# Patient Record
Sex: FEMALE | URBAN - METROPOLITAN AREA
[De-identification: names, ages, dates, MRNs, and addresses within clinical notes are randomized per-mention and may not be internally consistent; named-entity substitution may affect disease eponyms.]

---

## 2023-12-11 ENCOUNTER — HOSPITAL ENCOUNTER (INPATIENT)
Facility: HOSPITAL | Age: 88
LOS: 3 days | Discharge: NURSING, REHAB OR LONG-TERM CARE | DRG: 086 | End: 2023-12-14
Attending: EMERGENCY MEDICINE | Admitting: NURSE PRACTITIONER
Payer: MEDICARE

## 2023-12-11 ENCOUNTER — APPOINTMENT (OUTPATIENT)
Dept: RADIOLOGY | Facility: HOSPITAL | Age: 88
DRG: 086 | End: 2023-12-11
Payer: MEDICARE

## 2023-12-11 DIAGNOSIS — S22.20XA CLOSED FRACTURE OF STERNUM, UNSPECIFIED PORTION OF STERNUM, INITIAL ENCOUNTER: ICD-10-CM

## 2023-12-11 DIAGNOSIS — S02.832A: ICD-10-CM

## 2023-12-11 DIAGNOSIS — E87.1 HYPONATREMIA: ICD-10-CM

## 2023-12-11 DIAGNOSIS — S32.592A: ICD-10-CM

## 2023-12-11 DIAGNOSIS — S09.90XA HEAD INJURY, INITIAL ENCOUNTER: ICD-10-CM
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
## 2023-12-11 LAB
ABO GROUP (TYPE) IN BLOOD: NORMAL
ACANTHOCYTES BLD QL SMEAR: ABNORMAL
ALBUMIN SERPL BCP-MCNC: 3 G/DL (ref 3.4–5)
ALP SERPL-CCNC: 94 U/L (ref 33–136)
ALT SERPL W P-5'-P-CCNC: 9 U/L (ref 7–45)
ANION GAP SERPL CALC-SCNC: 11 MMOL/L (ref 10–20)
ANTIBODY SCREEN: NORMAL
AST SERPL W P-5'-P-CCNC: 15 U/L (ref 9–39)
BASOPHILS # BLD MANUAL: 0 X10*3/UL (ref 0–0.1)
BASOPHILS NFR BLD MANUAL: 0 %
BILIRUB SERPL-MCNC: 1.3 MG/DL (ref 0–1.2)
BUN SERPL-MCNC: 22 MG/DL (ref 6–23)
BURR CELLS BLD QL SMEAR: ABNORMAL
CALCIUM SERPL-MCNC: 10.3 MG/DL (ref 8.6–10.3)
CHLORIDE SERPL-SCNC: 90 MMOL/L (ref 98–107)
CO2 SERPL-SCNC: 28 MMOL/L (ref 21–32)
CREAT SERPL-MCNC: 1.05 MG/DL (ref 0.5–1.05)
EOSINOPHIL # BLD MANUAL: 0 X10*3/UL (ref 0–0.4)
EOSINOPHIL NFR BLD MANUAL: 0 %
ERYTHROCYTE [DISTWIDTH] IN BLOOD BY AUTOMATED COUNT: 16.4 % (ref 11.5–14.5)
GFR SERPL CREATININE-BSD FRML MDRD: 49 ML/MIN/1.73M*2
GLUCOSE SERPL-MCNC: 130 MG/DL (ref 74–99)
HCT VFR BLD AUTO: 30.5 % (ref 36–46)
HGB BLD-MCNC: 10.4 G/DL (ref 12–16)
IMM GRANULOCYTES # BLD AUTO: 0.26 X10*3/UL (ref 0–0.5)
IMM GRANULOCYTES NFR BLD AUTO: 0.6 % (ref 0–0.9)
INR PPP: 1.4 (ref 0.9–1.1)
LYMPHOCYTES # BLD MANUAL: 31.76 X10*3/UL (ref 0.8–3)
LYMPHOCYTES NFR BLD MANUAL: 79 %
MCH RBC QN AUTO: 30.2 PG (ref 26–34)
MCHC RBC AUTO-ENTMCNC: 34.1 G/DL (ref 32–36)
MCV RBC AUTO: 89 FL (ref 80–100)
MONOCYTES # BLD MANUAL: 1.61 X10*3/UL (ref 0.05–0.8)
MONOCYTES NFR BLD MANUAL: 4 %
MYELOCYTES # BLD MANUAL: 0.4 X10*3/UL
MYELOCYTES NFR BLD MANUAL: 1 %
NEUTS SEG # BLD MANUAL: 6.43 X10*3/UL (ref 1.6–5)
NEUTS SEG NFR BLD MANUAL: 16 %
NRBC BLD-RTO: 0 /100 WBCS (ref 0–0)
OVALOCYTES BLD QL SMEAR: ABNORMAL
PLATELET # BLD AUTO: 242 X10*3/UL (ref 150–450)
POLYCHROMASIA BLD QL SMEAR: ABNORMAL
POTASSIUM SERPL-SCNC: 4.1 MMOL/L (ref 3.5–5.3)
PROT SERPL-MCNC: 5.7 G/DL (ref 6.4–8.2)
PROTHROMBIN TIME: 15.9 SECONDS (ref 9.8–12.8)
RBC # BLD AUTO: 3.44 X10*6/UL (ref 4–5.2)
RBC MORPH BLD: ABNORMAL
RH FACTOR (ANTIGEN D): NORMAL
SODIUM SERPL-SCNC: 125 MMOL/L (ref 136–145)
TOTAL CELLS COUNTED BLD: 100
WBC # BLD AUTO: 40.2 X10*3/UL (ref 4.4–11.3)

## 2023-12-11 PROCEDURE — 70486 CT MAXILLOFACIAL W/O DYE: CPT

## 2023-12-11 PROCEDURE — 85007 BL SMEAR W/DIFF WBC COUNT: CPT | Performed by: EMERGENCY MEDICINE

## 2023-12-11 PROCEDURE — 73502 X-RAY EXAM HIP UNI 2-3 VIEWS: CPT | Mod: LT

## 2023-12-11 PROCEDURE — 71045 X-RAY EXAM CHEST 1 VIEW: CPT | Performed by: RADIOLOGY

## 2023-12-11 PROCEDURE — 85610 PROTHROMBIN TIME: CPT | Performed by: EMERGENCY MEDICINE

## 2023-12-11 PROCEDURE — 71260 CT THORAX DX C+: CPT | Performed by: RADIOLOGY

## 2023-12-11 PROCEDURE — 85027 COMPLETE CBC AUTOMATED: CPT | Performed by: EMERGENCY MEDICINE

## 2023-12-11 PROCEDURE — 85060 BLOOD SMEAR INTERPRETATION: CPT | Performed by: PATHOLOGY

## 2023-12-11 PROCEDURE — 86901 BLOOD TYPING SEROLOGIC RH(D): CPT | Performed by: EMERGENCY MEDICINE

## 2023-12-11 PROCEDURE — 74177 CT ABD & PELVIS W/CONTRAST: CPT | Performed by: RADIOLOGY

## 2023-12-11 PROCEDURE — 2550000001 HC RX 255 CONTRASTS: Performed by: EMERGENCY MEDICINE

## 2023-12-11 PROCEDURE — 80053 COMPREHEN METABOLIC PANEL: CPT | Performed by: EMERGENCY MEDICINE

## 2023-12-11 PROCEDURE — 36415 COLL VENOUS BLD VENIPUNCTURE: CPT | Performed by: EMERGENCY MEDICINE

## 2023-12-11 PROCEDURE — 73560 X-RAY EXAM OF KNEE 1 OR 2: CPT | Mod: RIGHT SIDE | Performed by: RADIOLOGY

## 2023-12-11 PROCEDURE — 72125 CT NECK SPINE W/O DYE: CPT

## 2023-12-11 PROCEDURE — 73502 X-RAY EXAM HIP UNI 2-3 VIEWS: CPT | Mod: LEFT SIDE | Performed by: RADIOLOGY

## 2023-12-11 PROCEDURE — 86900 BLOOD TYPING SEROLOGIC ABO: CPT | Performed by: EMERGENCY MEDICINE

## 2023-12-11 PROCEDURE — 73560 X-RAY EXAM OF KNEE 1 OR 2: CPT | Mod: RT,FY

## 2023-12-11 PROCEDURE — 70450 CT HEAD/BRAIN W/O DYE: CPT | Performed by: RADIOLOGY

## 2023-12-11 PROCEDURE — 74177 CT ABD & PELVIS W/CONTRAST: CPT

## 2023-12-11 PROCEDURE — G0390 TRAUMA RESPONS W/HOSP CRITI: HCPCS

## 2023-12-11 PROCEDURE — 70450 CT HEAD/BRAIN W/O DYE: CPT

## 2023-12-11 PROCEDURE — 71045 X-RAY EXAM CHEST 1 VIEW: CPT | Mod: FY

## 2023-12-11 PROCEDURE — 70486 CT MAXILLOFACIAL W/O DYE: CPT | Performed by: RADIOLOGY

## 2023-12-11 PROCEDURE — 72125 CT NECK SPINE W/O DYE: CPT | Performed by: RADIOLOGY

## 2023-12-11 PROCEDURE — 1210000001 HC SEMI-PRIVATE ROOM DAILY

## 2023-12-11 PROCEDURE — 99291 CRITICAL CARE FIRST HOUR: CPT | Mod: 25 | Performed by: EMERGENCY MEDICINE

## 2023-12-11 RX ADMIN — IOHEXOL 90 ML: 350 INJECTION, SOLUTION INTRAVENOUS at 21:15

## 2023-12-11 ASSESSMENT — COLUMBIA-SUICIDE SEVERITY RATING SCALE - C-SSRS
6. HAVE YOU EVER DONE ANYTHING, STARTED TO DO ANYTHING, OR PREPARED TO DO ANYTHING TO END YOUR LIFE?: NO
2. HAVE YOU ACTUALLY HAD ANY THOUGHTS OF KILLING YOURSELF?: NO
1. IN THE PAST MONTH, HAVE YOU WISHED YOU WERE DEAD OR WISHED YOU COULD GO TO SLEEP AND NOT WAKE UP?: NO

## 2023-12-12 ENCOUNTER — APPOINTMENT (OUTPATIENT)
Dept: RADIOLOGY | Facility: HOSPITAL | Age: 88
DRG: 086 | End: 2023-12-12
Payer: MEDICARE

## 2023-12-12 LAB
ALBUMIN SERPL BCP-MCNC: 3.2 G/DL (ref 3.4–5)
ALP SERPL-CCNC: 98 U/L (ref 33–136)
ALT SERPL W P-5'-P-CCNC: 10 U/L (ref 7–45)
ANION GAP SERPL CALC-SCNC: 10 MMOL/L (ref 10–20)
ANION GAP SERPL CALC-SCNC: 11 MMOL/L (ref 10–20)
ANION GAP SERPL CALC-SCNC: 13 MMOL/L (ref 10–20)
ANION GAP SERPL CALC-SCNC: 13 MMOL/L (ref 10–20)
AST SERPL W P-5'-P-CCNC: 19 U/L (ref 9–39)
BILIRUB SERPL-MCNC: 1.2 MG/DL (ref 0–1.2)
BUN SERPL-MCNC: 20 MG/DL (ref 6–23)
CALCIUM SERPL-MCNC: 10 MG/DL (ref 8.6–10.3)
CALCIUM SERPL-MCNC: 10.2 MG/DL (ref 8.6–10.3)
CALCIUM SERPL-MCNC: 9.7 MG/DL (ref 8.6–10.3)
CALCIUM SERPL-MCNC: 9.7 MG/DL (ref 8.6–10.3)
CHLORIDE SERPL-SCNC: 93 MMOL/L (ref 98–107)
CHLORIDE SERPL-SCNC: 94 MMOL/L (ref 98–107)
CHLORIDE SERPL-SCNC: 94 MMOL/L (ref 98–107)
CHLORIDE SERPL-SCNC: 96 MMOL/L (ref 98–107)
CO2 SERPL-SCNC: 26 MMOL/L (ref 21–32)
CO2 SERPL-SCNC: 27 MMOL/L (ref 21–32)
CO2 SERPL-SCNC: 28 MMOL/L (ref 21–32)
CO2 SERPL-SCNC: 29 MMOL/L (ref 21–32)
CREAT SERPL-MCNC: 0.98 MG/DL (ref 0.5–1.05)
CREAT SERPL-MCNC: 0.99 MG/DL (ref 0.5–1.05)
CREAT SERPL-MCNC: 0.99 MG/DL (ref 0.5–1.05)
CREAT SERPL-MCNC: 1.01 MG/DL (ref 0.5–1.05)
ERYTHROCYTE [DISTWIDTH] IN BLOOD BY AUTOMATED COUNT: 16.6 % (ref 11.5–14.5)
ERYTHROCYTE [DISTWIDTH] IN BLOOD BY AUTOMATED COUNT: 16.7 % (ref 11.5–14.5)
ERYTHROCYTE [DISTWIDTH] IN BLOOD BY AUTOMATED COUNT: 17 % (ref 11.5–14.5)
GFR SERPL CREATININE-BSD FRML MDRD: 51 ML/MIN/1.73M*2
GFR SERPL CREATININE-BSD FRML MDRD: 52 ML/MIN/1.73M*2
GFR SERPL CREATININE-BSD FRML MDRD: 52 ML/MIN/1.73M*2
GFR SERPL CREATININE-BSD FRML MDRD: 53 ML/MIN/1.73M*2
GLUCOSE SERPL-MCNC: 118 MG/DL (ref 74–99)
GLUCOSE SERPL-MCNC: 123 MG/DL (ref 74–99)
GLUCOSE SERPL-MCNC: 131 MG/DL (ref 74–99)
GLUCOSE SERPL-MCNC: 152 MG/DL (ref 74–99)
HCT VFR BLD AUTO: 28.5 % (ref 36–46)
HCT VFR BLD AUTO: 29.7 % (ref 36–46)
HCT VFR BLD AUTO: 34.5 % (ref 36–46)
HGB BLD-MCNC: 10.7 G/DL (ref 12–16)
HGB BLD-MCNC: 9.4 G/DL (ref 12–16)
HGB BLD-MCNC: 9.8 G/DL (ref 12–16)
HOLD SPECIMEN: NORMAL
HOLD SPECIMEN: NORMAL
MAGNESIUM SERPL-MCNC: 1.84 MG/DL (ref 1.6–2.4)
MCH RBC QN AUTO: 29.3 PG (ref 26–34)
MCH RBC QN AUTO: 29.4 PG (ref 26–34)
MCH RBC QN AUTO: 30.1 PG (ref 26–34)
MCHC RBC AUTO-ENTMCNC: 31 G/DL (ref 32–36)
MCHC RBC AUTO-ENTMCNC: 33 G/DL (ref 32–36)
MCHC RBC AUTO-ENTMCNC: 33 G/DL (ref 32–36)
MCV RBC AUTO: 89 FL (ref 80–100)
MCV RBC AUTO: 89 FL (ref 80–100)
MCV RBC AUTO: 97 FL (ref 80–100)
NRBC BLD-RTO: 0 /100 WBCS (ref 0–0)
PATH REVIEW-CBC DIFFERENTIAL: NORMAL
PLATELET # BLD AUTO: 218 X10*3/UL (ref 150–450)
PLATELET # BLD AUTO: 239 X10*3/UL (ref 150–450)
PLATELET # BLD AUTO: 245 X10*3/UL (ref 150–450)
POTASSIUM SERPL-SCNC: 3.6 MMOL/L (ref 3.5–5.3)
POTASSIUM SERPL-SCNC: 3.7 MMOL/L (ref 3.5–5.3)
POTASSIUM SERPL-SCNC: 3.9 MMOL/L (ref 3.5–5.3)
POTASSIUM SERPL-SCNC: 4 MMOL/L (ref 3.5–5.3)
PROT SERPL-MCNC: 5.7 G/DL (ref 6.4–8.2)
RBC # BLD AUTO: 3.21 X10*6/UL (ref 4–5.2)
RBC # BLD AUTO: 3.33 X10*6/UL (ref 4–5.2)
RBC # BLD AUTO: 3.56 X10*6/UL (ref 4–5.2)
SODIUM SERPL-SCNC: 128 MMOL/L (ref 136–145)
SODIUM SERPL-SCNC: 128 MMOL/L (ref 136–145)
SODIUM SERPL-SCNC: 130 MMOL/L (ref 136–145)
SODIUM SERPL-SCNC: 132 MMOL/L (ref 136–145)
WBC # BLD AUTO: 38.2 X10*3/UL (ref 4.4–11.3)
WBC # BLD AUTO: 40.8 X10*3/UL (ref 4.4–11.3)
WBC # BLD AUTO: 42.9 X10*3/UL (ref 4.4–11.3)

## 2023-12-12 PROCEDURE — 97165 OT EVAL LOW COMPLEX 30 MIN: CPT | Mod: GO

## 2023-12-12 PROCEDURE — 80048 BASIC METABOLIC PNL TOTAL CA: CPT | Performed by: NURSE PRACTITIONER

## 2023-12-12 PROCEDURE — 85027 COMPLETE CBC AUTOMATED: CPT | Performed by: NURSE PRACTITIONER

## 2023-12-12 PROCEDURE — 73060 X-RAY EXAM OF HUMERUS: CPT | Mod: LEFT SIDE | Performed by: RADIOLOGY

## 2023-12-12 PROCEDURE — 2500000004 HC RX 250 GENERAL PHARMACY W/ HCPCS (ALT 636 FOR OP/ED)

## 2023-12-12 PROCEDURE — 80048 BASIC METABOLIC PNL TOTAL CA: CPT | Mod: CCI

## 2023-12-12 PROCEDURE — 2500000001 HC RX 250 WO HCPCS SELF ADMINISTERED DRUGS (ALT 637 FOR MEDICARE OP)

## 2023-12-12 PROCEDURE — 1200000002 HC GENERAL ROOM WITH TELEMETRY DAILY

## 2023-12-12 PROCEDURE — 96372 THER/PROPH/DIAG INJ SC/IM: CPT

## 2023-12-12 PROCEDURE — 99222 1ST HOSP IP/OBS MODERATE 55: CPT

## 2023-12-12 PROCEDURE — 99223 1ST HOSP IP/OBS HIGH 75: CPT | Performed by: SURGERY

## 2023-12-12 PROCEDURE — 84075 ASSAY ALKALINE PHOSPHATASE: CPT

## 2023-12-12 PROCEDURE — 83735 ASSAY OF MAGNESIUM: CPT

## 2023-12-12 PROCEDURE — 36415 COLL VENOUS BLD VENIPUNCTURE: CPT | Performed by: NURSE PRACTITIONER

## 2023-12-12 PROCEDURE — 99222 1ST HOSP IP/OBS MODERATE 55: CPT | Performed by: NURSE PRACTITIONER

## 2023-12-12 PROCEDURE — 73060 X-RAY EXAM OF HUMERUS: CPT | Mod: LT,FY

## 2023-12-12 PROCEDURE — 99222 1ST HOSP IP/OBS MODERATE 55: CPT | Performed by: STUDENT IN AN ORGANIZED HEALTH CARE EDUCATION/TRAINING PROGRAM

## 2023-12-12 PROCEDURE — 85027 COMPLETE CBC AUTOMATED: CPT

## 2023-12-12 PROCEDURE — 97161 PT EVAL LOW COMPLEX 20 MIN: CPT | Mod: GP

## 2023-12-12 PROCEDURE — 2500000004 HC RX 250 GENERAL PHARMACY W/ HCPCS (ALT 636 FOR OP/ED): Performed by: NURSE PRACTITIONER

## 2023-12-12 RX ORDER — MULTIVIT-MIN/IRON FUM/FOLIC AC 7.5 MG-4
1 TABLET ORAL DAILY
COMMUNITY

## 2023-12-12 RX ORDER — LANOLIN ALCOHOL/MO/W.PET/CERES
1000 CREAM (GRAM) TOPICAL DAILY
COMMUNITY
Start: 2013-02-16 | End: 2023-12-12 | Stop reason: ENTERED-IN-ERROR

## 2023-12-12 RX ORDER — CALCIUM CARBONATE/VITAMIN D3 600MG-5MCG
1 TABLET ORAL DAILY
COMMUNITY

## 2023-12-12 RX ORDER — ATENOLOL 25 MG/1
25 TABLET ORAL NIGHTLY
Status: DISCONTINUED | OUTPATIENT
Start: 2023-12-12 | End: 2023-12-15 | Stop reason: HOSPADM

## 2023-12-12 RX ORDER — HEPARIN SODIUM 5000 [USP'U]/ML
5000 INJECTION, SOLUTION INTRAVENOUS; SUBCUTANEOUS EVERY 8 HOURS
Status: DISCONTINUED | OUTPATIENT
Start: 2023-12-12 | End: 2023-12-15 | Stop reason: HOSPADM

## 2023-12-12 RX ORDER — PRAVASTATIN SODIUM 20 MG/1
20 TABLET ORAL NIGHTLY
Status: DISCONTINUED | OUTPATIENT
Start: 2023-12-12 | End: 2023-12-15 | Stop reason: HOSPADM

## 2023-12-12 RX ORDER — ATENOLOL 25 MG/1
25 TABLET ORAL NIGHTLY
COMMUNITY
Start: 2016-05-16

## 2023-12-12 RX ORDER — SODIUM CHLORIDE 450 MG/100ML
75 INJECTION, SOLUTION INTRAVENOUS CONTINUOUS
Status: DISCONTINUED | OUTPATIENT
Start: 2023-12-12 | End: 2023-12-12

## 2023-12-12 RX ORDER — CHOLECALCIFEROL (VITAMIN D3) 125 MCG
125 CAPSULE ORAL DAILY
COMMUNITY

## 2023-12-12 RX ORDER — ACETAMINOPHEN AND PHENYLEPHRINE HCL 325; 5 MG/1; MG/1
1 TABLET ORAL EVERY OTHER DAY
COMMUNITY

## 2023-12-12 RX ORDER — ACETAMINOPHEN 325 MG/1
650 TABLET ORAL EVERY 6 HOURS PRN
Status: DISCONTINUED | OUTPATIENT
Start: 2023-12-12 | End: 2023-12-15 | Stop reason: HOSPADM

## 2023-12-12 RX ORDER — ACETAMINOPHEN, DIPHENHYDRAMINE HCL, PHENYLEPHRINE HCL 325; 25; 5 MG/1; MG/1; MG/1
1 TABLET ORAL DAILY
COMMUNITY

## 2023-12-12 RX ORDER — ASPIRIN 81 MG/1
1 TABLET ORAL DAILY
COMMUNITY
Start: 2013-03-04

## 2023-12-12 RX ORDER — VIT C/E/ZN/COPPR/LUTEIN/ZEAXAN 250MG-90MG
25 CAPSULE ORAL DAILY
COMMUNITY
End: 2023-12-12 | Stop reason: ENTERED-IN-ERROR

## 2023-12-12 RX ORDER — CLOPIDOGREL BISULFATE 75 MG/1
75 TABLET ORAL DAILY
COMMUNITY
Start: 2023-11-29

## 2023-12-12 RX ORDER — PRAVASTATIN SODIUM 20 MG/1
20 TABLET ORAL NIGHTLY
COMMUNITY
Start: 2016-05-16

## 2023-12-12 RX ORDER — GABAPENTIN 100 MG/1
100 CAPSULE ORAL 2 TIMES DAILY
Status: DISCONTINUED | OUTPATIENT
Start: 2023-12-12 | End: 2023-12-15 | Stop reason: HOSPADM

## 2023-12-12 RX ORDER — ONDANSETRON HYDROCHLORIDE 2 MG/ML
4 INJECTION, SOLUTION INTRAVENOUS EVERY 6 HOURS PRN
Status: DISCONTINUED | OUTPATIENT
Start: 2023-12-12 | End: 2023-12-15 | Stop reason: HOSPADM

## 2023-12-12 RX ORDER — ISOSORBIDE MONONITRATE 30 MG/1
30 TABLET, EXTENDED RELEASE ORAL DAILY
COMMUNITY
Start: 2023-11-29

## 2023-12-12 RX ORDER — CLOPIDOGREL BISULFATE 75 MG/1
75 TABLET ORAL DAILY
Status: DISCONTINUED | OUTPATIENT
Start: 2023-12-12 | End: 2023-12-12

## 2023-12-12 RX ORDER — SODIUM CHLORIDE 9 MG/ML
75 INJECTION, SOLUTION INTRAVENOUS CONTINUOUS
Status: DISCONTINUED | OUTPATIENT
Start: 2023-12-12 | End: 2023-12-12

## 2023-12-12 RX ORDER — GABAPENTIN 100 MG/1
100 CAPSULE ORAL 2 TIMES DAILY
COMMUNITY
Start: 2023-10-16

## 2023-12-12 RX ORDER — ASPIRIN 81 MG/1
81 TABLET ORAL DAILY
Status: DISCONTINUED | OUTPATIENT
Start: 2023-12-12 | End: 2023-12-15 | Stop reason: HOSPADM

## 2023-12-12 RX ORDER — OXYCODONE HYDROCHLORIDE 5 MG/1
5 TABLET ORAL EVERY 6 HOURS PRN
Status: DISCONTINUED | OUTPATIENT
Start: 2023-12-12 | End: 2023-12-13

## 2023-12-12 RX ORDER — ISOSORBIDE MONONITRATE 30 MG/1
30 TABLET, EXTENDED RELEASE ORAL DAILY
Status: DISCONTINUED | OUTPATIENT
Start: 2023-12-12 | End: 2023-12-15 | Stop reason: HOSPADM

## 2023-12-12 RX ORDER — OXYCODONE HYDROCHLORIDE 5 MG/1
2.5 TABLET ORAL EVERY 6 HOURS PRN
Status: DISCONTINUED | OUTPATIENT
Start: 2023-12-12 | End: 2023-12-13

## 2023-12-12 RX ADMIN — SODIUM CHLORIDE 75 ML/HR: 4.5 INJECTION, SOLUTION INTRAVENOUS at 14:21

## 2023-12-12 RX ADMIN — GABAPENTIN 100 MG: 100 CAPSULE ORAL at 21:02

## 2023-12-12 RX ADMIN — HEPARIN SODIUM 5000 UNITS: 5000 INJECTION INTRAVENOUS; SUBCUTANEOUS at 12:33

## 2023-12-12 RX ADMIN — GABAPENTIN 100 MG: 100 CAPSULE ORAL at 14:21

## 2023-12-12 RX ADMIN — ATENOLOL 25 MG: 25 TABLET ORAL at 21:02

## 2023-12-12 RX ADMIN — SODIUM CHLORIDE 75 ML/HR: 9 INJECTION, SOLUTION INTRAVENOUS at 05:46

## 2023-12-12 RX ADMIN — HEPARIN SODIUM 5000 UNITS: 5000 INJECTION INTRAVENOUS; SUBCUTANEOUS at 21:02

## 2023-12-12 RX ADMIN — PRAVASTATIN SODIUM 20 MG: 20 TABLET ORAL at 21:02

## 2023-12-12 SDOH — SOCIAL STABILITY: SOCIAL INSECURITY: DO YOU FEEL ANYONE HAS EXPLOITED OR TAKEN ADVANTAGE OF YOU FINANCIALLY OR OF YOUR PERSONAL PROPERTY?: NO

## 2023-12-12 SDOH — SOCIAL STABILITY: SOCIAL INSECURITY: DOES ANYONE TRY TO KEEP YOU FROM HAVING/CONTACTING OTHER FRIENDS OR DOING THINGS OUTSIDE YOUR HOME?: NO

## 2023-12-12 SDOH — SOCIAL STABILITY: SOCIAL INSECURITY: HAVE YOU HAD THOUGHTS OF HARMING ANYONE ELSE?: NO

## 2023-12-12 SDOH — SOCIAL STABILITY: SOCIAL INSECURITY: ARE THERE ANY APPARENT SIGNS OF INJURIES/BEHAVIORS THAT COULD BE RELATED TO ABUSE/NEGLECT?: NO

## 2023-12-12 SDOH — SOCIAL STABILITY: SOCIAL INSECURITY: DO YOU FEEL UNSAFE GOING BACK TO THE PLACE WHERE YOU ARE LIVING?: NO

## 2023-12-12 SDOH — SOCIAL STABILITY: SOCIAL INSECURITY: HAS ANYONE EVER THREATENED TO HURT YOUR FAMILY OR YOUR PETS?: NO

## 2023-12-12 SDOH — SOCIAL STABILITY: SOCIAL INSECURITY: WERE YOU ABLE TO COMPLETE ALL THE BEHAVIORAL HEALTH SCREENINGS?: YES

## 2023-12-12 SDOH — SOCIAL STABILITY: SOCIAL INSECURITY: ABUSE: ADULT

## 2023-12-12 SDOH — SOCIAL STABILITY: SOCIAL INSECURITY: ARE YOU OR HAVE YOU BEEN THREATENED OR ABUSED PHYSICALLY, EMOTIONALLY, OR SEXUALLY BY ANYONE?: NO

## 2023-12-12 ASSESSMENT — COGNITIVE AND FUNCTIONAL STATUS - GENERAL
MOVING TO AND FROM BED TO CHAIR: A LOT
MOBILITY SCORE: 16
MOVING FROM LYING ON BACK TO SITTING ON SIDE OF FLAT BED WITH BEDRAILS: A LITTLE
MOBILITY SCORE: 15
PATIENT BASELINE BEDBOUND: NO
DAILY ACTIVITIY SCORE: 19
CLIMB 3 TO 5 STEPS WITH RAILING: A LOT
TOILETING: A LITTLE
WALKING IN HOSPITAL ROOM: A LITTLE
PERSONAL GROOMING: A LITTLE
MOVING TO AND FROM BED TO CHAIR: A LITTLE
DRESSING REGULAR LOWER BODY CLOTHING: A LITTLE
MOVING FROM LYING ON BACK TO SITTING ON SIDE OF FLAT BED WITH BEDRAILS: A LITTLE
HELP NEEDED FOR BATHING: A LITTLE
CLIMB 3 TO 5 STEPS WITH RAILING: TOTAL
TOILETING: A LITTLE
TURNING FROM BACK TO SIDE WHILE IN FLAT BAD: A LITTLE
STANDING UP FROM CHAIR USING ARMS: A LITTLE
WALKING IN HOSPITAL ROOM: A LOT
HELP NEEDED FOR BATHING: A LITTLE
TURNING FROM BACK TO SIDE WHILE IN FLAT BAD: A LITTLE
DAILY ACTIVITIY SCORE: 21
DRESSING REGULAR UPPER BODY CLOTHING: A LITTLE
DRESSING REGULAR LOWER BODY CLOTHING: A LITTLE
STANDING UP FROM CHAIR USING ARMS: A LITTLE

## 2023-12-12 ASSESSMENT — LIFESTYLE VARIABLES
HOW OFTEN DO YOU HAVE 6 OR MORE DRINKS ON ONE OCCASION: NEVER
SKIP TO QUESTIONS 9-10: 1
PRESCIPTION_ABUSE_PAST_12_MONTHS: NO
AUDIT-C TOTAL SCORE: 0
HOW OFTEN DO YOU HAVE A DRINK CONTAINING ALCOHOL: NEVER
AUDIT-C TOTAL SCORE: 0
HOW MANY STANDARD DRINKS CONTAINING ALCOHOL DO YOU HAVE ON A TYPICAL DAY: PATIENT DOES NOT DRINK
SUBSTANCE_ABUSE_PAST_12_MONTHS: NO

## 2023-12-12 ASSESSMENT — ACTIVITIES OF DAILY LIVING (ADL)
ADEQUATE_TO_COMPLETE_ADL: YES
WALKS IN HOME: NEEDS ASSISTANCE
BATHING_ASSISTANCE: MINIMAL
ASSISTIVE_DEVICE: WALKER
TOILETING: NEEDS ASSISTANCE
GROOMING: INDEPENDENT
PATIENT'S MEMORY ADEQUATE TO SAFELY COMPLETE DAILY ACTIVITIES?: YES
BATHING: NEEDS ASSISTANCE
LACK_OF_TRANSPORTATION: NO
HEARING - RIGHT EAR: HEARING AID
DRESSING YOURSELF: NEEDS ASSISTANCE
JUDGMENT_ADEQUATE_SAFELY_COMPLETE_DAILY_ACTIVITIES: NO
FEEDING YOURSELF: INDEPENDENT
HEARING - LEFT EAR: FUNCTIONAL

## 2023-12-12 ASSESSMENT — COLUMBIA-SUICIDE SEVERITY RATING SCALE - C-SSRS
2. HAVE YOU ACTUALLY HAD ANY THOUGHTS OF KILLING YOURSELF?: NO
1. IN THE PAST MONTH, HAVE YOU WISHED YOU WERE DEAD OR WISHED YOU COULD GO TO SLEEP AND NOT WAKE UP?: NO
6. HAVE YOU EVER DONE ANYTHING, STARTED TO DO ANYTHING, OR PREPARED TO DO ANYTHING TO END YOUR LIFE?: NO

## 2023-12-12 ASSESSMENT — PATIENT HEALTH QUESTIONNAIRE - PHQ9
1. LITTLE INTEREST OR PLEASURE IN DOING THINGS: NOT AT ALL
SUM OF ALL RESPONSES TO PHQ9 QUESTIONS 1 & 2: 0
2. FEELING DOWN, DEPRESSED OR HOPELESS: NOT AT ALL

## 2023-12-12 ASSESSMENT — PAIN SCALES - GENERAL: PAINLEVEL_OUTOF10: 0 - NO PAIN

## 2023-12-12 ASSESSMENT — PAIN - FUNCTIONAL ASSESSMENT: PAIN_FUNCTIONAL_ASSESSMENT: 0-10

## 2023-12-12 NOTE — ED PROVIDER NOTES
HPI   Chief Complaint   Patient presents with    Fall     Pt presents to ED via EMS after a fall at home. Pt states she got up and fell, hitting her face. Pt on plavix, - LOC. [place] called at 2001.       89-year-old female who presents by squad for fall.  Patient states she has not been feeling well over the past couple days she was going to get up and walk when she fell forward and fell on her face.  She has significant trauma to her face and head.  She is on Plavix.  She is also complaining of swelling and pain to the left hip.  She denies any chest pain or shortness of breath.  Denies any abdominal pain.  Denies any back pain.                          [place] Coma Scale Score: 15                  Patient History   No past medical history on file.  No past surgical history on file.  No family history on file.  Social History     Tobacco Use    Smoking status: Not on file    Smokeless tobacco: Not on file   Substance Use Topics    Alcohol use: Not on file    Drug use: Not on file       Physical Exam   ED Triage Vitals [12/11/23 2012]   Temp Pulse Resp BP   37.6 °C (99.7 °F) -- 18 --      SpO2 Temp src Heart Rate Source Patient Position   99 % -- -- --      BP Location FiO2 (%)     -- --       Physical Exam  Constitutional:       Appearance: Normal appearance. She is normal weight.   HENT:      Head: Normocephalic.      Comments: Significant amount of ecchymosis and swelling noted to the left side of the face.     Nose: Nose normal.      Mouth/Throat:      Mouth: Mucous membranes are moist.      Pharynx: Oropharynx is clear.   Eyes:      Extraocular Movements: Extraocular movements intact.      Conjunctiva/sclera: Conjunctivae normal.      Pupils: Pupils are equal, round, and reactive to light.   Cardiovascular:      Rate and Rhythm: Normal rate and regular rhythm.   Pulmonary:      Effort: Pulmonary effort is normal.      Breath sounds: Normal breath sounds.   Abdominal:      General: Abdomen is flat. Bowel sounds  are normal.      Palpations: Abdomen is soft.   Musculoskeletal:      Cervical back: Normal range of motion and neck supple.      Comments: Hematoma noted to the left hip.   Skin:     General: Skin is warm and dry.      Capillary Refill: Capillary refill takes less than 2 seconds.   Neurological:      General: No focal deficit present.      Mental Status: She is alert.   Psychiatric:         Mood and Affect: Mood normal.         Behavior: Behavior normal.         Thought Content: Thought content normal.         Judgment: Judgment normal.       Labs Reviewed   CBC WITH AUTO DIFFERENTIAL - Abnormal       Result Value    WBC 40.2 (*)     nRBC 0.0      RBC 3.44 (*)     Hemoglobin 10.4 (*)     Hematocrit 30.5 (*)     MCV 89      MCH 30.2      MCHC 34.1      RDW 16.4 (*)     Platelets 242      Neutrophils %        Immature Granulocytes %, Automated        Lymphocytes %        Monocytes %        Eosinophils %        Basophils %        Neutrophils Absolute        Lymphocytes Absolute        Monocytes Absolute        Eosinophils Absolute        Basophils Absolute       COMPREHENSIVE METABOLIC PANEL - Abnormal    Glucose 130 (*)     Sodium 125 (*)     Potassium 4.1      Chloride 90 (*)     Bicarbonate 28      Anion Gap 11      Urea Nitrogen 22      Creatinine 1.05      eGFR 49 (*)     Calcium 10.3      Albumin 3.0 (*)     Alkaline Phosphatase 94      Total Protein 5.7 (*)     AST 15      Bilirubin, Total 1.3 (*)     ALT 9     PROTIME-INR - Abnormal    Protime 15.9 (*)     INR 1.4 (*)    TYPE AND SCREEN    ABO TYPE O      Rh TYPE NEG      ANTIBODY SCREEN NEG       CT head W O contrast trauma protocol   Final Result   No acute intracranial hemorrhage or depressed calvarial fracture        Cerebral atrophy and chronic ischemic change.        MACRO:   None        Signed by: Fly Rodriguez 12/11/2023 9:20 PM   Dictation workstation:   [id number]      CT maxillofacial bones wo IV contrast   Final Result   Subtle fracture of  anterior aspect of medial wall left orbit/lamina   papyracea.        Prominent soft tissue swelling and hemorrhage in the left facial soft   tissues.        MACRO:   None        Signed by: Fly Rodriguez 12/11/2023 9:25 PM   Dictation workstation:   [id number]      CT chest abdomen pelvis w IV contrast   Final Result   Multiple enlarged axillary, mediastinal, and hilar lymph nodes and   enlarged internal mammary lymph nodes. There are also enlarged   abdominal and pelvic lymph nodes and bilateral enlarged inguinal   lymph nodes. The findings are highly concerning for malignancy and   lymphoma or metastatic disease are differential considerations.        Displaced fracture of the sternum which appears subacute in nature.   Several bilateral chronic rib fractures are noted and subtle areas of   sclerosis in the ribs.        Subacute appearing fractures of left superior and inferior pubic   rami. Fracture of the sacrum at S3 which may be acute or subacute in   nature and correlation with point tenderness is recommended. Chronic   appearing compression deformity of L3.        Bilateral inguinal hernias with herniation of bowel.   No evidence of bowel obstruction.        MACRO:   None        Signed by: Fly Rodriguez 12/11/2023 9:55 PM   Dictation workstation:   [id number]      CT cervical spine wo IV contrast   Final Result   No evidence of acute fracture of the cervical spine.        Multilevel degenerative change of the cervical spine.        MACRO:   None        Signed by: Fly Rodriguez 12/11/2023 9:28 PM   Dictation workstation:   [id number]      XR chest 1 view   Final Result   Cardiomegaly without focal airspace consolidation.   Please see the dedicated CT chest report.        MACRO:   None        Signed by: Fly Rodriguez 12/11/2023 9:39 PM   Dictation workstation:   [id number]      XR hip left with pelvis when performed 2 or 3 views   Final Result   Fractures of the left superior and inferior pubic ramus.         Sacral fracture is better assessed on the CT examination.             MACRO:   None        Signed by: Fly Rodriguez 12/11/2023 9:41 PM   Dictation workstation:   [id number]      XR knee right 1-2 views   Final Result   No evidence of acute displaced right knee fracture.             MACRO:   None        Signed by: Fly Rodriguez 12/11/2023 9:40 PM   Dictation workstation:   [id number]            ED Course & MDM   Diagnoses as of 12/11/23 2223   Fall, initial encounter   Head injury, initial encounter   Closed fracture of medial wall of left orbit, initial encounter (CMS/[facility])   Closed fracture of multiple rami of left pubis, initial encounter (CMS/[facility])   Closed fracture of sternum, unspecified portion of sternum, initial encounter       Medical Decision Making  Emergency department course, laboratory studies were obtained and reviewed patient has an elevated white count of 40 but 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  Her sodium is 125.  CT of the head and facial bones does show a subtle fracture of the anterior aspect of the medial wall of the left orbit.  CT chest, abdomen, and pelvis shows a displaced sternal fracture.  Also fracture of the left inferior and superior rami.  I did discuss these findings with the's trauma LYNDON.  Patient will be admitted under the trauma service.        Procedure  Critical Care    Performed by: Africa Caruso DO  Authorized by: Africa Caruso DO    Critical care provider statement:     Critical care time (minutes):  45    Critical care time was exclusive of:  Separately billable procedures and treating other patients    Critical care was necessary to treat or prevent imminent or life-threatening deterioration of the following conditions:  Trauma    Critical care was time spent personally by me on the following activities:  Discussions with consultants, evaluation of patient's response to treatment and examination of patient    I assumed direction of critical care for this patient  from another provider in my specialty: flex Caruso, DO  12/11/23 0403

## 2023-12-12 NOTE — PROGRESS NOTES
Physical Therapy                 Therapy Communication Note    Patient Name: Rita Baez  MRN: [id number]  Today's Date: 12/12/2023     Discipline: Physical Therapy    Missed Visit Reason: Missed Visit Reason:  (fall. pelvic fx's, PT eval deferred await ortho consult)

## 2023-12-12 NOTE — ED TRIAGE NOTES
Pt presents to ED via EMS after a fall at home. Pt states she got up and fell, hitting her face. Pt on plavix, - LOC. [place] called at 2001.

## 2023-12-12 NOTE — H&P
History Of Present Illness  Rita Baez is a 89 y.o. female with past medical history significant for CLL, CKD3b, HTN, HLD, CAD s/p anteroseptal MI 1999 PCI to LAD (on Plavix) who presented to [facility] ED 12/11 as a LIMITED TRAUMA s/p ground level fall. Patient reports she lost her balance and fell forward striking her face on the ground. Denies LOC. Reports history of frequent falls and recently had another fall within the last week. States she uses a walker and has had poor balance.     In the ED, patient hemodynamically stable. Labs remarkable Na 125, WBC 40.2. Patient pan scanned with the following significant results: subtle fracture of anterior aspect of medial wall left orbit/lamina papyracea, prominent soft tissue swelling and hemorrhage in the left facial soft tissues, multiple enlarged axillary, mediastinal, hilar, and mammary lymph nodes, displaced fracture of the sternum which appears subacute, several bilat chronic rib fractures, subacute fractures of left superior and inferior pubic rami, fracture of the sacrum at S3 which may be acute or subacute, bilateral inguinal hernia with herniation of bowel without obstruction. Patient admitted to trauma for continued management.      Past Medical History  No past medical history on file.    Surgical History  No past surgical history on file.     Social History  She has no history on file for tobacco use, alcohol use, and drug use.    Family History  No family history on file.     Allergies  Patient has no allergy information on record.    A: Airway intact  B: Breathing spontaneously, breath sounds are bilateral and equal  C: Pulses 2+throughout and equal.    D: Pupils equal and reactive, GCS 15 (E4, V5, M6). Moving all 4 extremities  E: Patient exposed and additional injuries noted; Warm blankets placed on patient    Secondary Survey:  NEURO: A&O x3, GCS 15, CN II-XII intact, JACOB equally, muscle strength 5/5, no sensory deficits  HEAD: Left periorbital  "ecchymosis  EENT: PERRL, EOMI. Pupils 4-2mm b/l. external ear without laceration. Nasal septum midline, no crepitus or septal hematoma. Oral mucosa and tongue without lacerations, teeth in place.   NECK: No cervical spine tenderness or step offs, no lacerations or abrasions, trachea midline. No JVD.  RESPIRATORY/CHEST: No abrasions, contusions, crepitus or tenderness to palpation. Non-labored, equal chest expansion, CTAB, no W/R/R.  CV: RRR. Pulses bilateral: 2+ radial, 2+DP, 2+PT,  2+femoral and 2+ carotid. Sternal ecchymosis  ABDOMEN: soft, nontender, nondistended. No scars, abrasions or lacerations.  PELVIS: Stable to compression. Mild pain on left side to palpation  : nml external genitalia, no blood at urethral meatus. Bilateral inguinal hernias  RECTAL: rectal tone deferrred with no gross blood noted on exam.  BACK/SPINE: No thoracic midline tenderness, step-offs or deformities. No lumbar midline tenderness, step-offs, or deformities.  No abrasions, hematomas or lacerations noted.  EXTREMITIES: No edema or cyanosis. Nml ROM w/o pain. No deformities, lacerations. Scattered ecchymosis to all extremities. Pain on palpation of LUE       Last Recorded Vitals  Blood pressure 141/86, pulse 91, temperature 37.6 °C (99.7 °F), resp. rate 16, height 1.499 m (4' 11\"), weight 54.4 kg (120 lb), SpO2 96 %.    Relevant Results        Results for orders placed or performed during the hospital encounter of 12/11/23 (from the past 24 hour(s))   CBC and Auto Differential   Result Value Ref Range    WBC 40.2 (H) 4.4 - 11.3 x10*3/uL    nRBC 0.0 0.0 - 0.0 /100 WBCs    RBC 3.44 (L) 4.00 - 5.20 x10*6/uL    Hemoglobin 10.4 (L) 12.0 - 16.0 g/dL    Hematocrit 30.5 (L) 36.0 - 46.0 %    MCV 89 80 - 100 fL    MCH 30.2 26.0 - 34.0 pg    MCHC 34.1 32.0 - 36.0 g/dL    RDW 16.4 (H) 11.5 - 14.5 %    Platelets 242 150 - 450 x10*3/uL    Immature Granulocytes %, Automated 0.6 0.0 - 0.9 %    Immature Granulocytes Absolute, Automated 0.26 0.00 - " 0.50 x10*3/uL   Comprehensive Metabolic Panel   Result Value Ref Range    Glucose 130 (H) 74 - 99 mg/dL    Sodium 125 (L) 136 - 145 mmol/L    Potassium 4.1 3.5 - 5.3 mmol/L    Chloride 90 (L) 98 - 107 mmol/L    Bicarbonate 28 21 - 32 mmol/L    Anion Gap 11 10 - 20 mmol/L    Urea Nitrogen 22 6 - 23 mg/dL    Creatinine 1.05 0.50 - 1.05 mg/dL    eGFR 49 (L) >60 mL/min/1.73m*2    Calcium 10.3 8.6 - 10.3 mg/dL    Albumin 3.0 (L) 3.4 - 5.0 g/dL    Alkaline Phosphatase 94 33 - 136 U/L    Total Protein 5.7 (L) 6.4 - 8.2 g/dL    AST 15 9 - 39 U/L    Bilirubin, Total 1.3 (H) 0.0 - 1.2 mg/dL    ALT 9 7 - 45 U/L   Protime-INR   Result Value Ref Range    Protime 15.9 (H) 9.8 - 12.8 seconds    INR 1.4 (H) 0.9 - 1.1   Type And Screen   Result Value Ref Range    ABO TYPE O     Rh TYPE NEG     ANTIBODY SCREEN NEG    Manual Differential   Result Value Ref Range    Neutrophils %, Manual 16.0 40.0 - 80.0 %    Lymphocytes %, Manual 79.0 13.0 - 44.0 %    Monocytes %, Manual 4.0 2.0 - 10.0 %    Eosinophils %, Manual 0.0 0.0 - 6.0 %    Basophils %, Manual 0.0 0.0 - 2.0 %    Myelocytes %, Manual 1.0 0.0 - 0.0 %    Seg Neutrophils Absolute, Manual 6.43 (H) 1.60 - 5.00 x10*3/uL    Lymphocytes Absolute, Manual 31.76 (H) 0.80 - 3.00 x10*3/uL    Monocytes Absolute, Manual 1.61 (H) 0.05 - 0.80 x10*3/uL    Eosinophils Absolute, Manual 0.00 0.00 - 0.40 x10*3/uL    Basophils Absolute, Manual 0.00 0.00 - 0.10 x10*3/uL    Myelocytes Absolute, Manual 0.40 0.00 - 0.00 x10*3/uL    Total Cells Counted 100     RBC Morphology See Below     Polychromasia Mild     Ovalocytes Few     Haritha Cells Few     Acanthocytes Few    CBC   Result Value Ref Range    WBC 38.2 (H) 4.4 - 11.3 x10*3/uL    nRBC 0.0 0.0 - 0.0 /100 WBCs    RBC 3.21 (L) 4.00 - 5.20 x10*6/uL    Hemoglobin 9.4 (L) 12.0 - 16.0 g/dL    Hematocrit 28.5 (L) 36.0 - 46.0 %    MCV 89 80 - 100 fL    MCH 29.3 26.0 - 34.0 pg    MCHC 33.0 32.0 - 36.0 g/dL    RDW 16.6 (H) 11.5 - 14.5 %    Platelets 245 150 -  450 x10*3/uL     CT chest abdomen pelvis w IV contrast    Result Date: 12/11/2023  Interpreted By:  Fly Rodriguez, STUDY: CT CHEST ABDOMEN PELVIS W IV CONTRAST;  12/11/2023 9:05 pm   INDICATION: Signs/Symptoms:Trauma.   COMPARISON: None.   ACCESSION NUMBER(S): [id number]   ORDERING CLINICIAN: MANASA ORDONEZ   TECHNIQUE: Contiguous axial images of the chest, abdomen and pelvis were obtained after the intravenous administration of  contrast. Coronal and sagittal reformatted images were obtained from the axial images.   FINDINGS: CT CHEST:   There are multiple bilateral enlarged axillary lymph nodes which measure up to 1.3 cm. There are multiple enlarged mediastinal and hilar lymph nodes. Anterior paratracheal lymph nodes measure up to 1.5 cm. 1.8 cm precarinal lymph node and 1.5 cm and 2.1 cm subcarinal lymph nodes. Right hilar lymph nodes measure up to 0.5 cm and left hilar lymph nodes measure up to 1.4 cm. Enlarged internal mammary lymph nodes measure up to 1.5 cm.   The heart is normal in size. Coronary artery atherosclerotic calcifications. No significant pericardial effusion.   Mild bilateral subsegmental atelectasis. No significant pleural effusion. No pneumothorax.   There is displaced fracture of the sternum which appears subacute in nature. There are also several chronic bilateral rib fractures and subtle areas of sclerosis in the ribs.     CT ABDOMEN AND PELVIS:   No evidence of liver mass or laceration. The gallbladder is contracted and not well evaluated the common bile duct measures 6 mm in diameter.   The pancreas, spleen, and adrenal glands appear unremarkable.   Symmetric enhancement of the kidneys. Several subcentimeter hypodensity left kidney too small to characterize. No hydronephrosis.   Atherosclerotic calcification of the abdominal aorta and bilateral iliac arteries. 1.8 cm and 1.4 cm portal caval lymph nodes. Retroperitoneal lymph nodes measuring up to 1.5 cm. There are bilateral enlarged  pelvic sidewall lymph nodes which measure up to 3.3 cm x 0.9 cm on the left and 1.3 cm on the right. There are also multiple enlarged bilateral inguinal lymph nodes measuring up to 2.1 cm.   There are bilateral inguinal hernias with herniation of bowel. No evidence of bowel obstruction.   Urinary bladder is underdistended and not well evaluated.   Limited evaluation of the uterus and adnexa. 1.8 cm cystic lesion in the right adnexa.   There is comminuted fracture of the left superior pubic ramus and fracture of left inferior pubic ramus which appears subacute in nature. There is sacral fracture at S3 with angulation of the sacrum. There is compression deformity of L3 with approximately 50% vertebral body height loss. There is multilevel degenerative change of the lumbar spine. Grade 1 anterolisthesis of L4 on L5 and L5 on S1.       Multiple enlarged axillary, mediastinal, and hilar lymph nodes and enlarged internal mammary lymph nodes. There are also enlarged abdominal and pelvic lymph nodes and bilateral enlarged inguinal lymph nodes. The findings are highly concerning for malignancy and lymphoma or metastatic disease are differential considerations.   Displaced fracture of the sternum which appears subacute in nature. Several bilateral chronic rib fractures are noted and subtle areas of sclerosis in the ribs.   Subacute appearing fractures of left superior and inferior pubic rami. Fracture of the sacrum at S3 which may be acute or subacute in nature and correlation with point tenderness is recommended. Chronic appearing compression deformity of L3.   Bilateral inguinal hernias with herniation of bowel. No evidence of bowel obstruction.   MACRO: None   Signed by: Fly Rodriguez 12/11/2023 9:55 PM Dictation workstation:   [id number]    XR hip left with pelvis when performed 2 or 3 views    Result Date: 12/11/2023  Interpreted By:  Fly Rodriguez, STUDY: XR HIP LEFT WITH PELVIS WHEN PERFORMED 2 OR 3 VIEWS; ;   12/11/2023 8:45 pm   INDICATION: Signs/Symptoms:fall.   COMPARISON: None.   ACCESSION NUMBER(S): [id number]   ORDERING CLINICIAN: MANASA ORDONEZ   FINDINGS: There are fractures of the left superior and inferior pubic ramus. There is left hip osteoarthrosis. Sacral fracture is better assessed on the CT examination.       Fractures of the left superior and inferior pubic ramus.   Sacral fracture is better assessed on the CT examination.     MACRO: None   Signed by: Fly Rodriguez 12/11/2023 9:41 PM Dictation workstation:   [id number]    XR knee right 1-2 views    Result Date: 12/11/2023  Interpreted By:  Fly Rodriguez, STUDY: XR KNEE RIGHT 1-2 VIEWS; ;  12/11/2023 8:45 pm   INDICATION: Signs/Symptoms:fall.   COMPARISON: None.   ACCESSION NUMBER(S): [id number]   ORDERING CLINICIAN: MANASA ORDONEZ   FINDINGS: No evidence of acute displaced fracture of the right knee. There is patellofemoral osteoarthrosis with prominent superior osseous spurring. Mild narrowing of the medial compartment. No evidence of significant knee effusion.       No evidence of acute displaced right knee fracture.     MACRO: None   Signed by: Fly Rodriguez 12/11/2023 9:40 PM Dictation workstation:   [id number]    XR chest 1 view    Result Date: 12/11/2023  Interpreted By:  Fly Rodriguez, STUDY: XR CHEST 1 VIEW;  12/11/2023 8:45 pm   INDICATION: Signs/Symptoms:Trauma.   COMPARISON: None.   ACCESSION NUMBER(S): [id number]   ORDERING CLINICIAN: MANASA ORDONEZ   FINDINGS: There is cardiomegaly. Atherosclerotic calcification of the thoracic aorta. No focal airspace consolidation or pleural effusion. No pneumothorax.       Cardiomegaly without focal airspace consolidation. Please see the dedicated CT chest report.   MACRO: None   Signed by: Fly Rodriguez 12/11/2023 9:39 PM Dictation workstation:   [id number]    CT cervical spine wo IV contrast    Result Date: 12/11/2023  Interpreted By:  Fly Rodriguez, STUDY: CT CERVICAL SPINE WO IV CONTRAST;   12/11/2023 9:05 pm   INDICATION: Signs/Symptoms:fall.   COMPARISON: None.   ACCESSION NUMBER(S): [id number]   ORDERING CLINICIAN: MANASA ORDONEZ   TECHNIQUE: Contiguous axial images of the cervical spine were obtained without intravenous contrast. Coronal and sagittal reformatted images were obtained from the axial images.   FINDINGS: The examination is limited secondary to patient motion. No evidence acute fracture of the cervical spine. There is multilevel degenerative change of the cervical spine. There is grade 1 anterolisthesis of C2 on C3. There is multilevel intervertebral disc space narrowing and anterior osseous spurring. There is limited evaluation of the soft tissues of the spinal canal. There is mild multilevel posterior osseous spurring. There is mild degenerative facet and uncovertebral arthropathy. No significant prevertebral soft tissue edema.       No evidence of acute fracture of the cervical spine.   Multilevel degenerative change of the cervical spine.   MACRO: None   Signed by: Fly Rodriguez 12/11/2023 9:28 PM Dictation workstation:   [id number]    CT maxillofacial bones wo IV contrast    Result Date: 12/11/2023  Interpreted By:  Fly Rodriguez, STUDY: CT FACIAL BONES WO IV CONTRAST;  12/11/2023 9:05 pm   INDICATION: Signs/Symptoms:fall.   COMPARISON: None   ACCESSION NUMBER(S): [id number]   ORDERING CLINICIAN: MANASA ORDONEZ   TECHNIQUE: Contiguous axial images of the maxillofacial structures were obtained without intravenous contrast. Coronal and sagittal reformatted images were obtained from the axial images.   FINDINGS: The examination is limited secondary to patient motion and beam hardening artifact secondary to dental hardware. There is evidence of subtle fracture of medial wall left orbit/lamina papyracea. Evaluation of the maxilla and mandible is particularly limited secondary to beam hardening artifact. There is soft tissue swelling and hemorrhage in the left facial soft tissues.  There is small sinus air-fluid level in the left maxillary sinus and minimal mucosal thickening in left posterior ethmoid air cells.       Subtle fracture of anterior aspect of medial wall left orbit/lamina papyracea.   Prominent soft tissue swelling and hemorrhage in the left facial soft tissues.   MACRO: None   Signed by: Fly Rodriguez 12/11/2023 9:25 PM Dictation workstation:   [id number]    CT head W O contrast trauma protocol    Result Date: 12/11/2023  Interpreted By:  Fly Rodriguez, STUDY: CT HEAD W/O CONTRAST TRAUMA PROTOCOL;  12/11/2023 9:05 pm   INDICATION: Trauma. Signs/Symptoms:fall.   COMPARISON: None.   ACCESSION NUMBER(S): [id number]   ORDERING CLINICIAN: MANASA ORDONEZ   TECHNIQUE: Contiguous axial images of the head were obtained without intravenous contrast. Coronal and sagittal reformatted images were obtained from the axial images.   FINDINGS: BRAIN PARENCHYMA: There is cerebral atrophy and chronic periventricular white matter small vessel ischemic change. Hypodensity in the basal ganglia may correspond to old lacunar infarcts.   HEMORRHAGE: No evidence of acute intracranial hemorrhage. VENTRICLES AND EXTRA-AXIAL SPACES: The ventricles are within normal limits in size for brain volume.  No evidence of abnormal extraaxial fluid collection. PARANASAL SINUSES AND MASTOIDS: The visualized paranasal sinuses and mastoid air cells are clear and well pneumatized. CALVARIUM: No evidence of depressed calvarial fracture.   EXTRACRANIAL SOFT TISSUES: Within normal limits.   OTHER FINDINGS: None     Brain Injury (BIG) guidelines CT values:   Skull fracture: No SDH (subdural hematoma): None detected EDH (epidural hematoma): None detected IPH (intraparenchymal hemorrhage): None detected SAH (subarachnoid hemorrhage): None detected IVH (intraventricular hemorrhage): No   Reference: Pedro STONE, Regino RS, Ruben M, et al. The BIG (brain injury guidelines) project: defining the management of traumatic brain  injury by acute care surgeons. J Trauma Acute Care Surg. 2014;76:511b173.       No acute intracranial hemorrhage or depressed calvarial fracture   Cerebral atrophy and chronic ischemic change.   MACRO: None   Signed by: Fly Rodriguez 12/11/2023 9:20 PM Dictation workstation:   [id number]        Assessment/Plan   Principal Problem:    Fall, initial encounter      Rita Baez is a 89 y.o. female with past medical history significant for CLL, CKD3b, HTN, HLD, CAD s/p anteroseptal MI 1999 PCI to LAD (on Plavix) who presented to [facility] ED 12/11 as a LIMITED TRAUMA s/p ground level fall.    List of clinically significant injuries/problems:  Subtle fracture of anterior aspect of medial wall left orbit/lamina papyracea  Soft tissue swelling and hemorrhage in the left facial soft tissues  Displaced fracture of the sternum, subacute  Several bilat chronic rib fractures  Subacute fractures of left superior and inferior pubic rami  Fracture of the sacrum at S3, may be subacute  Bilateral inguinal hernia with herniation of bowel without obstruction  Hyponatremia  Leukocytosis with hx CLL    Assessment/Plan:    # Fall  # Left orbital fx with soft tissue swelling and hemorrhage  # Displaced fx of sternum  # Pubic rami fx  # S3 fx  - pending left humeral xray  - ortho and ENT consulted, pending eval  - multimodal pain control  - PT/OT  - NPO until cleared by consulting services    # Bilateral inguinal hernia with herniation of bowel w/o obstruction  - reports previous discussions regarding repair and declines at this time    # CLL  # Leukocytosis  - monitor  - medicine consulted, appreciate input    # Hyponatremia  - continue NS  - management per medicine    # DVT ppx  - hold plavix and DVT ppx until patient deemed nonop    Dispo: Admit to tele. Pending consult eval and PT/OT.  I spent 30 minutes in the professional and overall care of this patient.      Rachel Solano, APRN-CNP  I saw and evaluated the patient.  I personally  obtained the key and critical portions of the history and physical exam I was physically present for key and critical portions performed by the advanced practitioner.  I reviewed the advanced practitioner's documentation and discussed the patient with the advanced practitioner.  I agree with the advanced practitioner's medical decision making as documented in the advanced practitioner's note.  Comments/Additional Findings: Patient seen and examined as above.  Secondary survey at this point otherwise unremarkable.  Rib fractures following recurrent fall with sacral fracture in 89-year-old community dwelling her.  Continue conservative management with plan for skilled facility.  Consult for orbital fracture through the acromion, and orthopedics have been placed questions answered

## 2023-12-12 NOTE — CONSULTS
Consults    Reason For Consult  Medical management    History Of Present Illness  Rita Baez is a 89 y.o. female with history of CLL, CKD stage IIIb, hypertension, hyperlipidemia, coronary artery disease status post anteroseptal MI 1999 PCI to LAD on Plavix and aspirin presenting status post fall.  Patient is walker dependent, had difficulty with utilizing it yesterday and fell hitting her head.  Denies falling previously.  Lives by herself, has a son nearby that checks on her occasionally.    Regarding her CLL, is followed by oncology at [facility].    ED course as follows.  Initially hypertensive, which is now resolved.  Otherwise hemodynamically stable.  Lab work as follows.  Hyponatremia 125 (patient notes drinks 7 to 8 cups of water a day, denies poor appetite and states eats good meals such as pork chops), elevated T. bili (denies abdominal pain), anemia hemoglobin 9.8 (no acute blood loss).     Admitted under the surgery service, medicine consulted for medical management.  Radiographic findings as follows.  Subtle fracture of the anterior medial wall of the left orbit, displaced fracture of the sternum, chronic bilateral rib fractures (patient does not recall falling previously), fracture of left superior and inferior pubic rami and sacrum S3.  Other radiographic findings as follows, bilateral inguinal hernias and multiple enlarged lymph nodes including axillary, mediastinal, hilar, internal mamillary, abdominal, pelvic, bilateral inguinal.  Patient states has been notified regarding her lymph nodes previously and being followed by outpatient oncology.    -Medical/surgical history: As above  -Family history: Not notable  -Social history: Denies smoking or alcohol use  -CODE STATUS: DNR/DNI (patient is not interested in surgical interventions at this time).    10 systems reviewed and negative except otherwise noted above in HPI     Physical Exam  GENERAL: Elderly, awake/alert/oriented x3, no distress, alert and  Patient has a 3 mm right distal ureteral stone.  We were unable to prescribe narcotics as the patient is not established with our office.  Patient may contact his PCP to see if they will provide him with pain medication.  Patient should be encouraged to take his tamsulosin push fluids as he has a greater than 50% chance of passing the stone on his own.  Could also consider moving up his appointment.  I could see him tomorrow at 9:00 a.m. with KUB prior   cooperative  HEENT: AT/NC, PERRL, EOMI, left periorbital ecchymosis, nasal septum midline  NECK: Normal Inspection  CARDIOVASCULAR: RRR, no murmurs, 2+ equal pulses of the extremities, normal S1 and S 2, sternal ecchymosis  RESPIRATORY: CTAB, normal breath sounds with good chest expansion, No Wheezes, Rales or Rhonchi, not in acute respiratory distress  ABDOMEN: Soft, Non-Tender, Normal Bowel Sounds, No Distention, No organomegaly  Pelvis: Mild hip pain to palpation  : Bilateral inguinal hernias  SKIN: Warm  EXTREMITIES: normal extremities, no lower extremity edema, scattered ecchymosis, left upper extremity with pain to palpation  NEURO: A&O x 3, CN II-XII grossly intact, Normal Motor and Sensation  PSYCH: Appropriate     Last Recorded Vitals  /66   Pulse 82   Temp 37.6 °C (99.7 °F)   Resp 18   Wt 54.4 kg (120 lb)   SpO2 98%        Assessment/Plan     #Mechanical fall  #Left orbital fracture with soft tissue swelling and hemorrhage  #Displaced fracture of sternum  #Pubic rami fracture  #S3 fracture  -Orthopedic surgery not planning intervention.  PT OT evaluation, placement.  -Pain control  -ENT consulted.  N.p.o. until cleared    #Bilateral inguinal hernia with herniation of bowel without obstruction  -Surgery following, has had previous discussions regarding repair and declines at this time    #Hyponatremia, suspect secondary to increased water intake  -Sodium 125 on admission.  NS administered overnight.  Sodium 132 recently, NS discontinued initiated half-normal saline 75 cc an hour.  Recheck BMP.  -Counseled patient on fluid restriction at home.  States eats regular diet at home.    #CLL, multiple enlarged lymph nodes  -Followed by outpatient oncology at [facility]    #Anemia, not blood loss  -Monitor CBC    CKD stage IIIb  Hypertension  Hyperlipidemia  Coronary artery disease status post anterior septal MI 1999 PCI to LAD   -Continue home gabapentin, pravastatin  -Holding home antihypertensives  pending surgery plans for now  -conversation had with pt and family members at bedside regarding risks vs benefit of continuing plavix and aspirin. Family members state pt has had multiple falls recently and would like to dc plavix and continue aspirin. Educated extensively. Agreeable with plan.    DVT prophylaxis: Heparin subcu  CODE STATUS: DNR/DNI      Pauly Meyers DO  Internal Medicine, PGY-II

## 2023-12-12 NOTE — CONSULTS
Reason For Consult  Medial orbital fracture    History Of Present Illness  Rita Baez is a 89 y.o. female presenting with fall. ENT consulted for medial orbital fracture.    The patient was seen with her son and daughter-in-law.    The patient reports several falls in the last several days. The first was in her bedroom and the second was in the kitchen. She denies LOC; reports that this was mechanical in nature. She reports that she fell striking her left face.  She called a life alert service who then transported her to [facility] ED for evaluation.  Imaging was concerning for orbital fracture.  She denies any change in vision nor double vision.  Did initially have significant epistaxis but not since.  No change in occlusion.  She denies any significant nasal obstruction/congestion.    She is on DAPT following MI with PCI to LAD in 1999.     Past Medical History  She has a past medical history of CKD (chronic kidney disease), CLL (chronic lymphocytic leukemia) (CMS/[facility]), HLD (hyperlipidemia), HTN (hypertension), and MI (myocardial infarction) (CMS/[facility]).    Surgical History  She has no past surgical history on file.     Social History  She reports that she has never smoked. She does not have any smokeless tobacco history on file. Alcohol use questions deferred to the physician. No history on file for drug use.    Family History  No family history on file.     Allergies  Amoxicillin-pot clavulanate, Raspberry, Strawberry, Sulfa (sulfonamide antibiotics), Ciprofloxacin, Clotrimazole, Coenzyme q10, Cortisone, Nitrofurantoin, Simvastatin, and Thimerosal    Review of Systems  Negative except as noted above and in H&P     Physical Exam  CONSTITUTIONAL: Well appearing female who appears younger than stated age.  PSYCHIATRIC: Alert, appropriate mood and affect.  RESPIRATORY: Normal inspiration and expiration and chest wall expansion; no use of accessory muscles to breathe.  VOICE: Clear speech without hoarseness. No stridor  "nor stertor.  HEAD, FACE, AND SKIN: Symmetric facial feature.  Multiple evolving ecchymoses overlying the forehead and left face.  The parotid and submandibular glands were normal to palpation.  EYES: Pupils were equal in size and reactive to light. Extra-ocular muscle function was intact. No nystagmus was observed. Vision was grossly intact.  EARS: External ears were normally formed with no lesions.  NOSE: Nasal dorsum was midline. Anterior rhinoscopy demonstrated a septum relatively midline. Inferior turbinates were not hypertrophied. No obvious nasal masses, polyps, mucopurulence, nor other lesions were appreciated.  No evidence of recent bleeding.  ORAL CAVITY: Lips were without lesions. Moist mucous membranes. No lesions appreciated along the gingiva, oral mucosa, nor tongue.  DENTITION: Grossly normal without obvious infection nor inflammation.  OROPHARYNX: No lesion nor mucosal abnormality. The uvula was normal appearing.  No blood in the oropharynx.  NECK: Visualization and palpation of the neck revealed no mass lesions, no thyromegaly or thyroid masses. No cutaneous lesions appreciated.  LYMPHATICS (CERVICAL): There were no palpable lymph nodes in the posterior triangle, submandibular triangle, jugulodigastric region, nor central neck.  NEUROLOGIC: Cranial nerves III, IV, and VI were noted to be intact via extra-ocular muscle movement testing. Cranial nerve V was noted to be intact to soft touch bilaterally. Cranial nerve VII was noted to be intact and symmetric by facial movement. Cranial nerve VIII was tested with normal voice examination and revealed grossly decreased hearing. Cranial nerves IX and X noted to be intact by palatal movement. Cranial nerve XI noted to be intact via shoulder shrug.  Cranial nerve XII noted to be intact with active and symmetric tongue movement.       Last Recorded Vitals  Blood pressure 143/66, pulse 82, temperature 37.6 °C (99.7 °F), resp. rate 18, height 1.499 m (4' 11\"), " weight 54.4 kg (120 lb), SpO2 98 %.    Relevant Results  CT max-fac 12/11/2023: I personally viewed the patient's recent imaging.  There is some scattered opacification of the paranasal sinuses consistent with history of bleeding.  There is an exceptionally subtle fracture involving the left medial orbital wall at its junction with the nasolacrimal duct.  Otherwise no significant fracture appreciated.  No temporal bone fracture noted.     Assessment/Plan     89 y.o. female admitted for observation following fall.  ENT consulted for medial orbital wall fracture.    Medial orbital wall fracture  On my review of the imaging the patient has no significant displacement of her medial orbital wall.  Extraocular muscle function is intact.  No surgical intervention is necessary.  This does not require further workup nor follow-up.    2.  Epistaxis  The patient reported initial significant epistaxis which is now stopped.  I do not appreciate significant nasal fracture on exam.  We discussed that she can use saline spray to help clear the likely blood within the paranasal sinuses as needed.    3.  Facial ecchymosis  The patient has multiple sites of ecchymosis overlying the left face.  We discussed that these will resolve with time.    Thank you very much for involving me in the care of your patient.    Rashid Grissom MD    This note was created using speech recognition transcription software. Despite proofreading, typographical errors may be present that affect the meaning of the content. Please contact my office with any questions.    Patient's care discussed with primary team.  Imaging reviewed.

## 2023-12-12 NOTE — PROGRESS NOTES
Occupational Therapy    Evaluation    Patient Name: Rita Baez  MRN: [id number]  Today's Date: 12/12/2023  Time Calculation  Start Time: 0954  Stop Time: 1015  Time Calculation (min): 21 min        Assessment:  Prognosis: Good  Evaluation/Treatment Tolerance: Patient limited by fatigue  End of Session Patient Position: Bed, 2 rail up (call light in reach, hob elevated to comfort)  OT Assessment Results: Decreased ADL status, Decreased endurance, Decreased functional mobility (Decreased functional transfers)    Plan:  Treatment Interventions: ADL retraining, Functional transfer training, UE strengthening/ROM, Neuromuscular reeducation  OT Frequency: 3 times per week  OT Discharge Recommendations: Moderate intensity level of continued care  OT - OK to Discharge: Yes (from an O.T. standpoint)    Subjective   Current Problem:  1. Fall, initial encounter        2. Head injury, initial encounter        3. Closed fracture of medial wall of left orbit, initial encounter (CMS/[facility])        4. Closed fracture of multiple rami of left pubis, initial encounter (CMS/[facility])        5. Closed fracture of sternum, unspecified portion of sternum, initial encounter          General:  General  Reason for Referral: OT eval & treat:trauma patient   12/11/23: Fall, LOB, head injury, closed fracture L orbit, L inferior & superior pubic rami fracture, sternum fracture, S3 fracture. Ortho consult WBAT  Referred By: Rachel OLSEN  Past Medical History Relevant to Rehab: CLL, CKD III, HTN, hyperlipidemia, CAD s/p MI, PCI to LAD  Prior to Session Communication: Bedside nurse (Per conference [facility] RN, patient is medically stable for therapy eval)  Precautions:  Precautions Comment: fall/safety, multiple fractures, WBAT     Pain:  Pain Assessment  Pain Assessment:  (patient denies pain)    Objective   Cognition:  Overall Cognitive Status:  (A & O x3, cues to exact date. Min cues for safety/hand placement)           Home Living:  Home Living  Comments: Alone in apartment with elevator access; family in area supportive. Independent ADLs, transfers & mobility with rollator. Has high toilet with grab bar, tub/shower with seat, grab bar & hand held shower. Family drives & assists IADLs. Independent to reheat food.       ADL:  Eating Assistance: Independent  Grooming Assistance: Independent  Bathing Assistance: Minimal  UE Dressing Assistance: Minimal  LE Dressing Assistance: Minimal  Toileting Assistance with Device: Minimal  ADL Comments: assist for balance in standing during ADLS; may benefit from adaptive equipment for LE ADLs     Bed Mobility/Transfers: Bed Mobility  Bed Mobility:  (SBA supine <-> sit)  Transfers  Transfer:  (CGA for balance sit to stand from ED cart and from raised toilet with grab bar)      Ambulation/Gait Training:  Functional mobility Performed:  (CGA for balance during mobility with wheeled walker)      Strength:  Strength Comments: Denies numbness/tingling     Extremities: RUE   RUE :  (AROM 100 degrees shoulder flexion, WFL elbow-distally; MMT not tested;  4-/5) and LUE   LUE:  (AROM 100 degrees shoulder flexion, WFL elbow-distally; MMT not tested;  4-/5)        Outcome Measures:[facility] Daily Activity  Putting on and taking off regular lower body clothing: A little  Bathing (including washing, rinsing, drying): A little  Putting on and taking off regular upper body clothing: A little  Toileting, which includes using toilet, bedpan or urinal: A little  Taking care of personal grooming such as brushing teeth: A little  Eating Meals: None  Daily Activity - Total Score: 19        Education Documentation  ADL Training, taught by Zayda Kim OT at 12/12/2023  2:44 PM.  Learner: Patient  Readiness: Acceptance  Method: Explanation, Demonstration  Response: Verbalizes Understanding      EDUCATION:  Education  Individual(s) Educated: Patient  Education Provided: Fall precautons, POC discussed and agreed upon  Patient/Caregiver  Demonstrated Understanding: yes    Goals:  Encounter Problems       Encounter Problems (Active)       OT Goals       Increase LE bathing & dressing to supervision with adaptive equipment prn (Progressing)       Start:  12/12/23    Expected End:  12/26/23            Increase toileting to supervision with DME for safety (Progressing)       Start:  12/12/23    Expected End:  12/26/23            Increase transfers to/from bed, chair & commode to supervision with DME for safety (Progressing)       Start:  12/12/23    Expected End:  12/26/23            Increase dynamic stand balance to supervision with UE support to promote increased safety with ADLs (Progressing)       Start:  12/12/23    Expected End:  12/26/23

## 2023-12-12 NOTE — PROGRESS NOTES
Physical Therapy    Physical Therapy Evaluation    Patient Name: Rita Baez  MRN: [id number]  Today's Date: 12/12/2023   Time Calculation  Start Time: 0954  Stop Time: 1015  Time Calculation (min): 21 min    Assessment/Plan   PT Assessment  PT Assessment Results: Decreased strength, Decreased endurance, Impaired balance, Decreased mobility, Decreased safety awareness  Evaluation/Treatment Tolerance: Patient limited by fatigue  Assessment Comment: Continued skilled PT intervention indicated to facilitate increased strength, balance & gait stability  End of Session Patient Position: Bed, 2 rail up (call light in reach, hob elevated to comfort)  IP OR SWING BED PT PLAN  Inpatient or Swing Bed: Inpatient  PT Plan  Treatment/Interventions: Bed mobility, Transfer training, Gait training, Balance training, Endurance training, Therapeutic exercise, Therapeutic activity  PT Plan: Skilled PT  PT Frequency: 3 times per week  PT Discharge Recommendations: Moderate intensity level of continued care  PT - OK to Discharge: Yes (to next level of care when cleared by medical team)    Subjective     Current Problem:  Patient Active Problem List   Diagnosis    Fall, initial encounter       General Visit Information:  General  Reason for Referral: PT eval & treat/impaired mobility (12/11/23 fall 2/2 loss of balance, closed fx lt orbit, lt inferior & superior pubic rami fx, stenum fx, S3 fx, Ortho consult: WBAT)  Referred By: Russ  Past Medical History Relevant to Rehab: cll, ckdIII, htn, hld, cad s/p mi, pci to lad, oa hands  Missed Visit: Yes  Missed Visit Reason:  (fall. pelvic fx's, PT eval deferred await ortho consult)  Caregiver Feedback: Per conference w/ RN patient stable to participate in therapy  General Comment: Pleasant & cooperative, receptive to mobility& instructions, lt facila bruis, ue bruising    Home Living:  Home Living  Lives With:  (alone in apartment w/ elevtor access, tub shower w/ seat w/ grab bar & hand held  shower; raised toilet w/ bilat grab bars; standard bed)    Prior Level of Function:  Prior Function Per Pt/Caregiver Report  Level of [place]:  (independent mobility w/ use of rollator h/o  3recent falls; showers w/ distant supervision of dtr or son, otherwise ind adl; pt able to prep meal otherwise children manage iadls/driving)    Precautions:  Precautions  Precautions Comment: fall  Pain:  Pain Assessment  Pain Assessment:  (denies pain/numbness/tingling)    Functional Assessments:     Bed Mobility  Bed Mobility:  (sba supine<>sit labored movement)  Transfers  Transfer:  (cga sit<>stand to/from bed & commode)  Ambulation/Gait Training  Ambulation/Gait Training Performed:  (cga w/ ww 20ftx2 assist to manage ww, cues for safe position to ww, pt is accustomed to rollator)   Extremity/Trunk Assessments:        RLE   RLE :  (arom wfl, strength : hip flexion 3/5 antigravity, distally 4/5)  LLE   LLE :  (arom wfl, strength : hip flexion 3/5 antigravity, distally 4/5)    Outcome Measures:  [facility] Basic Mobility  Turning from your back to your side while in a flat bed without using bedrails: A little  Moving from lying on your back to sitting on the side of a flat bed without using bedrails: A little  Moving to and from bed to chair (including a wheelchair): A little  Standing up from a chair using your arms (e.g. wheelchair or bedside chair): A little  To walk in hospital room: A little  Climbing 3-5 steps with railing: Total  Basic Mobility - Total Score: 16   Goals:  Encounter Problems       Encounter Problems (Active)       PT Problem       bed mobility  (Progressing)       Start:  12/12/23    Expected End:  12/26/23       Independent supine<>sit         transfers  (Progressing)       Start:  12/12/23    Expected End:  12/26/23       Modified ind sit<>stand w/ ww         gait   (Progressing)       Start:  12/12/23    Expected End:  12/26/23       Modified ind gait w/ ww >=100ft         balance   (Progressing)        Start:  12/12/23    Expected End:  12/26/23       Independent maintaining sitting supported balance while performing >=20reps ble therex to facilitate inc fxl mobility& gait stability               Education Documentation  Mobility Training, taught by Radha Atkinson PT at 12/12/2023  1:23 PM.  Learner: Patient  Readiness: Acceptance  Method: Explanation  Response: Verbalizes Understanding, Needs Reinforcement  Comment: safety, use of assistive device, activity progression

## 2023-12-12 NOTE — PROGRESS NOTES
Pharmacy Medication History Review    Rita Baez is a 89 y.o. female admitted for Fall, initial encounter. Pharmacy reviewed the patient's [id number] medications and allergies for accuracy.    The list below reflectives the updated PTA list. Please review each medication in order reconciliation for additional clarification and justification.  (Not in a hospital admission)       The list below reflectives the updated allergy list. Please review each documented allergy for additional clarification and justification.  Allergies  Reviewed by Reina Dean CPhT on 12/12/2023        Severity Reactions Comments    Amoxicillin-pot Clavulanate Medium Diarrhea     Raspberry Not Specified Unknown     Strawberry Not Specified Unknown     Sulfa (sulfonamide Antibiotics) Not Specified Unknown     Ciprofloxacin Low Other Pt reports having pain and redness in her throat after being on this medication for a few days.    Clotrimazole Low Rash     Coenzyme Q10 Low Rash rash    Cortisone Low Hives     Nitrofurantoin Low Rash Rash on ankles    Simvastatin Low Other Abnormal liver test    Thimerosal Low Hives             Below are additional concerns with the patient's PTA list.    See PTA med list    Reina Dean CPhT

## 2023-12-13 LAB
ANION GAP SERPL CALC-SCNC: 10 MMOL/L (ref 10–20)
ANION GAP SERPL CALC-SCNC: 12 MMOL/L (ref 10–20)
ANION GAP SERPL CALC-SCNC: 9 MMOL/L (ref 10–20)
BUN SERPL-MCNC: 19 MG/DL (ref 6–23)
BUN SERPL-MCNC: 21 MG/DL (ref 6–23)
BUN SERPL-MCNC: 22 MG/DL (ref 6–23)
CALCIUM SERPL-MCNC: 9.2 MG/DL (ref 8.6–10.3)
CALCIUM SERPL-MCNC: 9.5 MG/DL (ref 8.6–10.3)
CALCIUM SERPL-MCNC: 9.7 MG/DL (ref 8.6–10.3)
CHLORIDE SERPL-SCNC: 93 MMOL/L (ref 98–107)
CHLORIDE SERPL-SCNC: 93 MMOL/L (ref 98–107)
CHLORIDE SERPL-SCNC: 95 MMOL/L (ref 98–107)
CO2 SERPL-SCNC: 26 MMOL/L (ref 21–32)
CO2 SERPL-SCNC: 27 MMOL/L (ref 21–32)
CO2 SERPL-SCNC: 28 MMOL/L (ref 21–32)
CREAT SERPL-MCNC: 0.96 MG/DL (ref 0.5–1.05)
CREAT SERPL-MCNC: 1.01 MG/DL (ref 0.5–1.05)
CREAT SERPL-MCNC: 1.03 MG/DL (ref 0.5–1.05)
CREAT UR-MCNC: 83.8 MG/DL (ref 20–320)
ERYTHROCYTE [DISTWIDTH] IN BLOOD BY AUTOMATED COUNT: 16.8 % (ref 11.5–14.5)
GFR SERPL CREATININE-BSD FRML MDRD: 50 ML/MIN/1.73M*2
GFR SERPL CREATININE-BSD FRML MDRD: 51 ML/MIN/1.73M*2
GFR SERPL CREATININE-BSD FRML MDRD: 54 ML/MIN/1.73M*2
GLUCOSE SERPL-MCNC: 100 MG/DL (ref 74–99)
GLUCOSE SERPL-MCNC: 123 MG/DL (ref 74–99)
GLUCOSE SERPL-MCNC: 145 MG/DL (ref 74–99)
HCT VFR BLD AUTO: 28 % (ref 36–46)
HGB BLD-MCNC: 9.4 G/DL (ref 12–16)
MAGNESIUM SERPL-MCNC: 1.58 MG/DL (ref 1.6–2.4)
MCH RBC QN AUTO: 30.1 PG (ref 26–34)
MCHC RBC AUTO-ENTMCNC: 33.6 G/DL (ref 32–36)
MCV RBC AUTO: 90 FL (ref 80–100)
NRBC BLD-RTO: 0 /100 WBCS (ref 0–0)
PLATELET # BLD AUTO: 225 X10*3/UL (ref 150–450)
POTASSIUM SERPL-SCNC: 3.7 MMOL/L (ref 3.5–5.3)
POTASSIUM SERPL-SCNC: 4.3 MMOL/L (ref 3.5–5.3)
POTASSIUM SERPL-SCNC: 4.4 MMOL/L (ref 3.5–5.3)
RBC # BLD AUTO: 3.12 X10*6/UL (ref 4–5.2)
SODIUM SERPL-SCNC: 125 MMOL/L (ref 136–145)
SODIUM SERPL-SCNC: 127 MMOL/L (ref 136–145)
SODIUM SERPL-SCNC: 129 MMOL/L (ref 136–145)
SODIUM UR-SCNC: 17 MMOL/L
SODIUM/CREAT UR-RTO: 20 MMOL/G CREAT
WBC # BLD AUTO: 38.5 X10*3/UL (ref 4.4–11.3)

## 2023-12-13 PROCEDURE — 99231 SBSQ HOSP IP/OBS SF/LOW 25: CPT | Performed by: SURGERY

## 2023-12-13 PROCEDURE — 2500000001 HC RX 250 WO HCPCS SELF ADMINISTERED DRUGS (ALT 637 FOR MEDICARE OP): Performed by: STUDENT IN AN ORGANIZED HEALTH CARE EDUCATION/TRAINING PROGRAM

## 2023-12-13 PROCEDURE — 2500000001 HC RX 250 WO HCPCS SELF ADMINISTERED DRUGS (ALT 637 FOR MEDICARE OP)

## 2023-12-13 PROCEDURE — 36415 COLL VENOUS BLD VENIPUNCTURE: CPT | Performed by: NURSE PRACTITIONER

## 2023-12-13 PROCEDURE — 96372 THER/PROPH/DIAG INJ SC/IM: CPT

## 2023-12-13 PROCEDURE — 1200000002 HC GENERAL ROOM WITH TELEMETRY DAILY

## 2023-12-13 PROCEDURE — 80048 BASIC METABOLIC PNL TOTAL CA: CPT

## 2023-12-13 PROCEDURE — 2500000004 HC RX 250 GENERAL PHARMACY W/ HCPCS (ALT 636 FOR OP/ED)

## 2023-12-13 PROCEDURE — 36415 COLL VENOUS BLD VENIPUNCTURE: CPT

## 2023-12-13 PROCEDURE — 80048 BASIC METABOLIC PNL TOTAL CA: CPT | Performed by: NURSE PRACTITIONER

## 2023-12-13 PROCEDURE — 99232 SBSQ HOSP IP/OBS MODERATE 35: CPT

## 2023-12-13 PROCEDURE — 83735 ASSAY OF MAGNESIUM: CPT

## 2023-12-13 PROCEDURE — 83935 ASSAY OF URINE OSMOLALITY: CPT | Mod: PARLAB

## 2023-12-13 PROCEDURE — 82570 ASSAY OF URINE CREATININE: CPT

## 2023-12-13 PROCEDURE — 85027 COMPLETE CBC AUTOMATED: CPT | Performed by: NURSE PRACTITIONER

## 2023-12-13 RX ORDER — SODIUM CHLORIDE 9 MG/ML
75 INJECTION, SOLUTION INTRAVENOUS CONTINUOUS
Status: DISCONTINUED | OUTPATIENT
Start: 2023-12-13 | End: 2023-12-13

## 2023-12-13 RX ORDER — POTASSIUM CHLORIDE 1.5 G/1.58G
20 POWDER, FOR SOLUTION ORAL ONCE
Status: COMPLETED | OUTPATIENT
Start: 2023-12-13 | End: 2023-12-13

## 2023-12-13 RX ORDER — OXYCODONE HYDROCHLORIDE 5 MG/1
2.5 TABLET ORAL EVERY 6 HOURS PRN
Status: DISCONTINUED | OUTPATIENT
Start: 2023-12-13 | End: 2023-12-15 | Stop reason: HOSPADM

## 2023-12-13 RX ORDER — MAGNESIUM SULFATE HEPTAHYDRATE 40 MG/ML
4 INJECTION, SOLUTION INTRAVENOUS ONCE
Status: COMPLETED | OUTPATIENT
Start: 2023-12-13 | End: 2023-12-13

## 2023-12-13 RX ADMIN — HEPARIN SODIUM 5000 UNITS: 5000 INJECTION INTRAVENOUS; SUBCUTANEOUS at 13:29

## 2023-12-13 RX ADMIN — ASPIRIN 81 MG: 81 TABLET, COATED ORAL at 09:30

## 2023-12-13 RX ADMIN — ISOSORBIDE MONONITRATE 30 MG: 30 TABLET, EXTENDED RELEASE ORAL at 09:30

## 2023-12-13 RX ADMIN — SODIUM CHLORIDE 75 ML/HR: 9 INJECTION, SOLUTION INTRAVENOUS at 13:28

## 2023-12-13 RX ADMIN — ATENOLOL 25 MG: 25 TABLET ORAL at 20:46

## 2023-12-13 RX ADMIN — HEPARIN SODIUM 5000 UNITS: 5000 INJECTION INTRAVENOUS; SUBCUTANEOUS at 04:15

## 2023-12-13 RX ADMIN — MAGNESIUM SULFATE IN WATER 4 G: 4 INJECTION, SOLUTION INTRAVENOUS at 09:30

## 2023-12-13 RX ADMIN — GABAPENTIN 100 MG: 100 CAPSULE ORAL at 20:46

## 2023-12-13 RX ADMIN — GABAPENTIN 100 MG: 100 CAPSULE ORAL at 09:30

## 2023-12-13 RX ADMIN — SALINE NASAL SPRAY 2 SPRAY: 1.5 SOLUTION NASAL at 09:31

## 2023-12-13 RX ADMIN — HEPARIN SODIUM 5000 UNITS: 5000 INJECTION INTRAVENOUS; SUBCUTANEOUS at 20:46

## 2023-12-13 RX ADMIN — POTASSIUM CHLORIDE 20 MEQ: 1.5 POWDER, FOR SOLUTION ORAL at 09:30

## 2023-12-13 RX ADMIN — PRAVASTATIN SODIUM 20 MG: 20 TABLET ORAL at 20:46

## 2023-12-13 ASSESSMENT — COGNITIVE AND FUNCTIONAL STATUS - GENERAL
PERSONAL GROOMING: A LITTLE
MOBILITY SCORE: 12
MOVING FROM LYING ON BACK TO SITTING ON SIDE OF FLAT BED WITH BEDRAILS: A LITTLE
MOVING TO AND FROM BED TO CHAIR: A LOT
CLIMB 3 TO 5 STEPS WITH RAILING: TOTAL
DRESSING REGULAR LOWER BODY CLOTHING: A LITTLE
STANDING UP FROM CHAIR USING ARMS: A LOT
DRESSING REGULAR LOWER BODY CLOTHING: TOTAL
PERSONAL GROOMING: A LITTLE
MOVING FROM LYING ON BACK TO SITTING ON SIDE OF FLAT BED WITH BEDRAILS: A LOT
MOVING TO AND FROM BED TO CHAIR: TOTAL
DRESSING REGULAR UPPER BODY CLOTHING: A LITTLE
TOILETING: TOTAL
CLIMB 3 TO 5 STEPS WITH RAILING: TOTAL
EATING MEALS: A LITTLE
DAILY ACTIVITIY SCORE: 18
TOILETING: A LITTLE
DRESSING REGULAR UPPER BODY CLOTHING: A LOT
WALKING IN HOSPITAL ROOM: A LOT
DAILY ACTIVITIY SCORE: 12
EATING MEALS: A LITTLE
STANDING UP FROM CHAIR USING ARMS: TOTAL
TURNING FROM BACK TO SIDE WHILE IN FLAT BAD: TOTAL
HELP NEEDED FOR BATHING: A LOT
WALKING IN HOSPITAL ROOM: TOTAL
HELP NEEDED FOR BATHING: A LITTLE
TURNING FROM BACK TO SIDE WHILE IN FLAT BAD: A LOT
MOBILITY SCORE: 7

## 2023-12-13 ASSESSMENT — PAIN SCALES - GENERAL: PAINLEVEL_OUTOF10: 0 - NO PAIN

## 2023-12-13 NOTE — PROGRESS NOTES
"Rita Baez is a 89 y.o. female on day 2 of admission presenting with Fall, initial encounter.    Subjective   Pt has no complaints today. Denies face, chest, or hip pain. Denies visual changes or pain with eye movement. Jose SOB or pain with inspiration. Has not been out of bed yet, confirmed with nursing that she is not on bed rest and should be getting out of bed.       Objective     Physical Exam  Constitutional:       Appearance: Normal appearance.   HENT:      Head: No raccoon eyes, Olivia's sign or laceration.      Comments: Lt periorbital hematoma and ecchymosis, non-tender     Mouth/Throat:      Mouth: Mucous membranes are moist.   Eyes:      Extraocular Movements: Extraocular movements intact.      Pupils: Pupils are equal, round, and reactive to light.   Cardiovascular:      Rate and Rhythm: Normal rate and regular rhythm.   Pulmonary:      Effort: Pulmonary effort is normal. No respiratory distress.      Breath sounds: Normal breath sounds.   Chest:      Comments: No sternal or chest wall TTP, no ecchymosis or palpable deformity  Abdominal:      General: Abdomen is flat.      Palpations: Abdomen is soft.      Tenderness: There is no abdominal tenderness.   Musculoskeletal:      Cervical back: Normal range of motion.   Skin:     General: Skin is warm and dry.      Coloration: Skin is not jaundiced or pale.   Neurological:      General: No focal deficit present.      Mental Status: She is alert and oriented to person, place, and time.   Psychiatric:         Mood and Affect: Mood normal.         Behavior: Behavior normal.         Last Recorded Vitals  Blood pressure 124/66, pulse 85, temperature 37 °C (98.6 °F), resp. rate 19, height 1.499 m (4' 11\"), weight 54.4 kg (120 lb), SpO2 96 %.  Intake/Output last 3 Shifts:  No intake/output data recorded.    Relevant Results  WBC   Date/Time Value Ref Range Status   12/13/2023 06:52 AM 38.5 (H) 4.4 - 11.3 x10*3/uL Final     Hemoglobin   Date/Time Value Ref Range " Status   12/13/2023 06:52 AM 9.4 (L) 12.0 - 16.0 g/dL Final     Platelets   Date/Time Value Ref Range Status   12/13/2023 06:52  150 - 450 x10*3/uL Final     Sodium   Date/Time Value Ref Range Status   12/13/2023 06:52  (L) 136 - 145 mmol/L Final     Potassium   Date/Time Value Ref Range Status   12/13/2023 06:52 AM 3.7 3.5 - 5.3 mmol/L Final     Urea Nitrogen   Date/Time Value Ref Range Status   12/13/2023 06:52 AM 19 6 - 23 mg/dL Final     Creatinine   Date/Time Value Ref Range Status   12/13/2023 06:52 AM 0.96 0.50 - 1.05 mg/dL Final       Assessment/Plan   Principal Problem:    Fall, initial encounter    Rita Baez is a 89 y.o. female with past medical history significant for CLL, CKD3b, HTN, HLD, CAD s/p anteroseptal MI 1999 PCI to LAD (on Plavix) who presented to [facility] ED 12/11 as a LIMITED TRAUMA s/p ground level fall.     List of clinically significant injuries/problems:  Subtle fracture of anterior aspect of medial wall left orbit/lamina papyracea  Soft tissue swelling and hemorrhage in the left facial soft tissues  Displaced fracture of the sternum, subacute  Several bilat chronic rib fractures  Subacute fractures of left superior and inferior pubic rami  Fracture of the sacrum at S3, may be subacute  Bilateral inguinal hernia with herniation of bowel without obstruction  Hyponatremia  Leukocytosis with hx CLL     Assessment/Plan:     # Left orbital fx with soft tissue swelling and hemorrhage  - ENT consulted, appreciate input    > non-op  - Ice to Lt orbit  - Multimodal pain management    # Displaced fx of sternum  # Pubic rami fx  # S3 fx  - Ortho consulted, appreciate input    > non-op    > WBAT  - multimodal pain control  - PT/OT     # Bilateral inguinal hernia with herniation of bowel w/o obstruction  - reports previous discussions regarding repair and declines at this time     # CLL  # Leukocytosis  - monitor  - medicine consulted, appreciate input     # Hyponatremia  - management  per medicine     # DVT ppx  - SCDs, SQH    Dispo: pre-cert pending, likely discharge tomorrow.    Patient seen and discussed with attending surgeon, Dr. Lindsay.    I spent 20 minutes in the professional and overall care of this patient.      Theresa Alfred PA-C

## 2023-12-13 NOTE — CARE PLAN
The patient's goals for the shift include  get out of bed     The clinical goals for the shift include Dont fall      Problem: Pain - Adult  Goal: Verbalizes/displays adequate comfort level or baseline comfort level  Outcome: Progressing     Problem: Safety - Adult  Goal: Free from fall injury  Outcome: Progressing     Problem: Discharge Planning  Goal: Discharge to home or other facility with appropriate resources  Outcome: Progressing     Problem: Chronic Conditions and Co-morbidities  Goal: Patient's chronic conditions and co-morbidity symptoms are monitored and maintained or improved  Outcome: Progressing     Problem: Skin  Goal: Decreased wound size/increased tissue granulation at next dressing change  Outcome: Progressing  Flowsheets (Taken 12/13/2023 1841)  Decreased wound size/increased tissue granulation at next dressing change:   Promote sleep for wound healing   Protective dressings over bony prominences  Goal: Participates in plan/prevention/treatment measures  Outcome: Progressing  Flowsheets (Taken 12/13/2023 1841)  Participates in plan/prevention/treatment measures:   Discuss with provider PT/OT consult   Elevate heels   Increase activity/out of bed for meals  Goal: Prevent/manage excess moisture  Outcome: Progressing  Flowsheets (Taken 12/13/2023 1841)  Prevent/manage excess moisture:   Cleanse incontinence/protect with barrier cream   Moisturize dry skin   Follow provider orders for dressing changes   Monitor for/manage infection if present  Goal: Prevent/minimize sheer/friction injuries  Outcome: Progressing  Flowsheets (Taken 12/13/2023 1841)  Prevent/minimize sheer/friction injuries:   HOB 30 degrees or less   Increase activity/out of bed for meals   Turn/reposition every 2 hours/use positioning/transfer devices   Use pull sheet  Goal: Promote/optimize nutrition  Outcome: Progressing  Flowsheets (Taken 12/13/2023 1841)  Promote/optimize nutrition:   Offer water/supplements/favorite foods    Consume > 50% meals/supplements   Monitor/record intake including meals  Goal: Promote skin healing  Outcome: Progressing  Flowsheets (Taken 12/13/2023 1841)  Promote skin healing:   Assess skin/pad under line(s)/device(s)   Ensure correct size (line/device) and apply per  instructions   Protective dressings over bony prominences   Rotate device position/do not position patient on device   Turn/reposition every 2 hours/use positioning/transfer devices

## 2023-12-13 NOTE — PROGRESS NOTES
Trauma Surgery Attending Note    My Acute Care Surgery LYNDON (Advanced Practice Provider) evaluated this patient today.  Please see that documentation for details.    I saw the patient with the LYNDON today, and personally evaluated and examined the patient.  My findings are consistent with those of the LYNDON.        Impression:      Known injuries:    # Fall  # Left orbital fx with soft tissue swelling and hemorrhage  # Displaced fx of sternum  # Pubic rami fx  # S3 fx    Patient is clinically stable  Appreciate medicine, ENT, and orthopedic input    Plan:      Continue OT PT  SNF placement

## 2023-12-13 NOTE — PROGRESS NOTES
"Rita Baez is a 89 y.o. female on day 2 of admission presenting with Fall, initial encounter.    Subjective   Seen and examined at bedside this morning, resting comfortably.  States her left hip pain is well-controlled.  Has not yet mobilized with physical therapy while inpatient.       Objective     Physical Exam    Left lower extremity:  Diffuse bruising and ecchymosis throughout extremities and facial region.  Mild tenderness palpation about the pubic symphysis and sacrum.  Tolerates full passive range of motion about the left hip without discomfort.  Negative logroll, heel strike.  Motor 5/5 intact throughout the left lower extremity.  Sensation intact light touch L4-S1.  Palpable DP pulse.    Last Recorded Vitals  Blood pressure 124/66, pulse 85, temperature 37 °C (98.6 °F), resp. rate 19, height 1.499 m (4' 11\"), weight 54.4 kg (120 lb), SpO2 96 %.  Intake/Output last 3 Shifts:  No intake/output data recorded.    Relevant Results              Results for orders placed or performed during the hospital encounter of 12/11/23 (from the past 24 hour(s))   CBC   Result Value Ref Range    WBC 42.9 (H) 4.4 - 11.3 x10*3/uL    nRBC 0.0 0.0 - 0.0 /100 WBCs    RBC 3.56 (L) 4.00 - 5.20 x10*6/uL    Hemoglobin 10.7 (L) 12.0 - 16.0 g/dL    Hematocrit 34.5 (L) 36.0 - 46.0 %    MCV 97 80 - 100 fL    MCH 30.1 26.0 - 34.0 pg    MCHC 31.0 (L) 32.0 - 36.0 g/dL    RDW 17.0 (H) 11.5 - 14.5 %    Platelets 218 150 - 450 x10*3/uL   Comprehensive Metabolic Panel   Result Value Ref Range    Glucose 118 (H) 74 - 99 mg/dL    Sodium 130 (L) 136 - 145 mmol/L    Potassium 4.0 3.5 - 5.3 mmol/L    Chloride 94 (L) 98 - 107 mmol/L    Bicarbonate 27 21 - 32 mmol/L    Anion Gap 13 10 - 20 mmol/L    Urea Nitrogen 20 6 - 23 mg/dL    Creatinine 0.99 0.50 - 1.05 mg/dL    eGFR 52 (L) >60 mL/min/1.73m*2    Calcium 10.2 8.6 - 10.3 mg/dL    Albumin 3.2 (L) 3.4 - 5.0 g/dL    Alkaline Phosphatase 98 33 - 136 U/L    Total Protein 5.7 (L) 6.4 - 8.2 g/dL    " AST 19 9 - 39 U/L    Bilirubin, Total 1.2 0.0 - 1.2 mg/dL    ALT 10 7 - 45 U/L   Magnesium   Result Value Ref Range    Magnesium 1.84 1.60 - 2.40 mg/dL   Green Top   Result Value Ref Range    Extra Tube Hold for add-ons.    Basic metabolic panel   Result Value Ref Range    Glucose 152 (H) 74 - 99 mg/dL    Sodium 128 (L) 136 - 145 mmol/L    Potassium 3.6 3.5 - 5.3 mmol/L    Chloride 93 (L) 98 - 107 mmol/L    Bicarbonate 26 21 - 32 mmol/L    Anion Gap 13 10 - 20 mmol/L    Urea Nitrogen 20 6 - 23 mg/dL    Creatinine 1.01 0.50 - 1.05 mg/dL    eGFR 51 (L) >60 mL/min/1.73m*2    Calcium 10.0 8.6 - 10.3 mg/dL   CBC   Result Value Ref Range    WBC 38.5 (H) 4.4 - 11.3 x10*3/uL    nRBC 0.0 0.0 - 0.0 /100 WBCs    RBC 3.12 (L) 4.00 - 5.20 x10*6/uL    Hemoglobin 9.4 (L) 12.0 - 16.0 g/dL    Hematocrit 28.0 (L) 36.0 - 46.0 %    MCV 90 80 - 100 fL    MCH 30.1 26.0 - 34.0 pg    MCHC 33.6 32.0 - 36.0 g/dL    RDW 16.8 (H) 11.5 - 14.5 %    Platelets 225 150 - 450 x10*3/uL   Basic metabolic panel   Result Value Ref Range    Glucose 100 (H) 74 - 99 mg/dL    Sodium 129 (L) 136 - 145 mmol/L    Potassium 3.7 3.5 - 5.3 mmol/L    Chloride 95 (L) 98 - 107 mmol/L    Bicarbonate 26 21 - 32 mmol/L    Anion Gap 12 10 - 20 mmol/L    Urea Nitrogen 19 6 - 23 mg/dL    Creatinine 0.96 0.50 - 1.05 mg/dL    eGFR 54 (L) >60 mL/min/1.73m*2    Calcium 9.2 8.6 - 10.3 mg/dL   Magnesium   Result Value Ref Range    Magnesium 1.58 (L) 1.60 - 2.40 mg/dL                    Assessment/Plan   Principal Problem:    Fall, initial encounter    Remote at least 3-month old left superior and inferior pubic rami fractures which are not healed to date.  #2 mild arthritis left hip and right knee #3multiple diffuse ecchymosis and right knee abrasion #4 remote L3 compression fracture #5 debility of age.     - WBAT BLE  - Mobilize with PT/OT   - Case management evaluation for likely SNF placement  - Orthopedics will follow        I spent 10 minutes in the professional and  overall care of this patient.      Curtis Montejo, DO

## 2023-12-13 NOTE — PROGRESS NOTES
"Rita Baez is a 89 y.o. female on day 2 of admission presenting with Fall, initial encounter.    Subjective    Feeling much better this am, no adl complaints. Eating and drinking comfortably.    Objective     Physical Exam  GENERAL: Elderly, awake/alert/oriented x3, no distress, alert and cooperative  HEENT: AT/NC, PERRL, EOMI, left periorbital ecchymosis (improving), nasal septum midline  NECK: Normal Inspection  CARDIOVASCULAR: RRR, no murmurs, 2+ equal pulses of the extremities, normal S1 and S 2, sternal ecchymosis  RESPIRATORY: CTAB, normal breath sounds with good chest expansion, No Wheezes, Rales or Rhonchi, not in acute respiratory distress  ABDOMEN: Soft, Non-Tender, Normal Bowel Sounds, No Distention  Pelvis: Mild hip pain to palpation  : Bilateral inguinal hernias  SKIN: Warm  EXTREMITIES: normal extremities, no lower extremity edema, scattered ecchymosis (improving), left upper extremity with pain to palpation (improving)  NEURO: A&O x 3, no focal deficits  PSYCH: Appropriate    Last Recorded Vitals  Blood pressure 110/54, pulse 67, temperature 37.3 °C (99.1 °F), resp. rate 19, height 1.499 m (4' 11\"), weight 54.4 kg (120 lb), SpO2 97 %.    Assessment/Plan     #Hyponatremia, suspect secondary to increased water intake  -Sodium 125 on admission (NS administered with improvement and then subsequently discontinued with 0.45 NS initiation as want to correct Na 6-8 within a 24 hour window to avoid osmotic demyelination, Na began to decrease thus fluids were discontinued and fluid restriction initiated). Na downtrending this am, thus NS initiated with further decrease in Na, thus will dc NS at this time, re-initiate fluid restriction and check urine Na and osm with repeat BMP in am. Suspect her hyponatremia is secondary to increased free water intake, asymptomatic at this time. Will follow.   -Counseled patient on fluid restriction at home. States eats regular diet at home.    #Mechanical fall  #Left " orbital fracture with soft tissue swelling and hemorrhage  #Displaced fracture of sternum  #Pubic rami fracture  #S3 fracture  -Orthopedic surgery not planning intervention.  PT OT evaluation, placement.  -Pain control  -ENT with no plans for intervention     #Bilateral inguinal hernia with herniation of bowel without obstruction  -Surgery following, has had previous discussions regarding repair and declines at this time     #CLL, multiple enlarged lymph nodes  -Followed by outpatient oncology at [facility]     #Anemia, not blood loss  -Monitor CBC     CKD stage IIIb  Hypertension  Hyperlipidemia  Coronary artery disease status post anterior septal MI 1999 PCI to LAD   -Continue home gabapentin, pravastatin, antihypertensives  -conversation had with pt and family members at bedside regarding risks vs benefit of continuing plavix and aspirin. Family members state pt has had multiple falls recently and would like to dc plavix and continue aspirin. Educated extensively. Agreeable with plan.     DVT prophylaxis: Heparin subcu  CODE STATUS: DNR/DNI      Pauly Meyers DO  Internal Medicine, PGY-II

## 2023-12-13 NOTE — PROGRESS NOTES
12/13/23 1198   Discharge Planning   Living Arrangements Alone   Support Systems Children;Family members   Assistance Needed housekeeping, shopping, rides to appointments, meal preparation   Type of Residence Private residence   Patient expects to be discharged to: SNF then transition to AL/LTC   Does the patient need discharge transport arranged? Yes   RoundTrip coordination needed? Yes     I met with patient and her daughter Kerri ([phone]) at the bedside, verified insurance and demographics.  Patient is hard of hearing so most information was obtained from Kerri.  Patient lives alone.  She uses a rollator and walker and requires assistance with some ADLs, meal preparation, laundry, housekeeping, grocery shopping, rides to errands and dr appts.  Patient's son/POA and daughter in law assist most.  Patient has had frequent falls of late and per Kerri, is no longer able to live alone.  Kerri was recommended [facility] but does not feel patient is able to participate in therapies for 3hrs/day and will not be able to return home afterwards.  Kerri still requested referral be sent.  Kerri also verbalized her preference for [facility] and [facility] for when ARF is unable to accept.  Referrals sent to [facility], [facility] & [facility].  Care Coordination team to continue to follow for assistance with discharge planning.  Margarita PAREDES TCC

## 2023-12-13 NOTE — PROGRESS NOTES
Spiritual Care Visit    Clinical Encounter Type  Visited With: Patient and family together  Routine Visit: Introduction  Continue Visiting: Yes    Gnosticism Encounters  Gnosticism Needs: Spiritual care brochure, Prayer         welcomed by caregiver.  Patient was in fragile condition, sedate and non verbal.  Prayer and blessing spoken.  Additional visits advised.

## 2023-12-14 VITALS
HEIGHT: 59 IN | SYSTOLIC BLOOD PRESSURE: 112 MMHG | WEIGHT: 120 LBS | RESPIRATION RATE: 18 BRPM | OXYGEN SATURATION: 95 % | TEMPERATURE: 100.8 F | HEART RATE: 91 BPM | BODY MASS INDEX: 24.19 KG/M2 | DIASTOLIC BLOOD PRESSURE: 68 MMHG

## 2023-12-14 PROBLEM — E87.1 HYPONATREMIA: Status: ACTIVE | Noted: 2023-12-14
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
## 2023-12-14 LAB
ANION GAP SERPL CALC-SCNC: 9 MMOL/L (ref 10–20)
BUN SERPL-MCNC: 25 MG/DL (ref 6–23)
CALCIUM SERPL-MCNC: 9.1 MG/DL (ref 8.6–10.3)
CHLORIDE SERPL-SCNC: 94 MMOL/L (ref 98–107)
CO2 SERPL-SCNC: 27 MMOL/L (ref 21–32)
CREAT SERPL-MCNC: 1.07 MG/DL (ref 0.5–1.05)
GFR SERPL CREATININE-BSD FRML MDRD: 48 ML/MIN/1.73M*2
GLUCOSE SERPL-MCNC: 106 MG/DL (ref 74–99)
HOLD SPECIMEN: NORMAL
MAGNESIUM SERPL-MCNC: 1.87 MG/DL (ref 1.6–2.4)
OSMOLALITY UR: 395 MOSM/KG (ref 200–1200)
POTASSIUM SERPL-SCNC: 4.4 MMOL/L (ref 3.5–5.3)
SODIUM SERPL-SCNC: 126 MMOL/L (ref 136–145)

## 2023-12-14 PROCEDURE — 2500000001 HC RX 250 WO HCPCS SELF ADMINISTERED DRUGS (ALT 637 FOR MEDICARE OP)

## 2023-12-14 PROCEDURE — 1200000002 HC GENERAL ROOM WITH TELEMETRY DAILY

## 2023-12-14 PROCEDURE — 99232 SBSQ HOSP IP/OBS MODERATE 35: CPT | Performed by: INTERNAL MEDICINE

## 2023-12-14 PROCEDURE — 99231 SBSQ HOSP IP/OBS SF/LOW 25: CPT | Performed by: SURGERY

## 2023-12-14 PROCEDURE — 36415 COLL VENOUS BLD VENIPUNCTURE: CPT

## 2023-12-14 PROCEDURE — 80048 BASIC METABOLIC PNL TOTAL CA: CPT

## 2023-12-14 PROCEDURE — 96372 THER/PROPH/DIAG INJ SC/IM: CPT

## 2023-12-14 PROCEDURE — 83735 ASSAY OF MAGNESIUM: CPT

## 2023-12-14 PROCEDURE — 99232 SBSQ HOSP IP/OBS MODERATE 35: CPT

## 2023-12-14 PROCEDURE — 2500000004 HC RX 250 GENERAL PHARMACY W/ HCPCS (ALT 636 FOR OP/ED)

## 2023-12-14 RX ORDER — LANOLIN ALCOHOL/MO/W.PET/CERES
200 CREAM (GRAM) TOPICAL ONCE
Status: COMPLETED | OUTPATIENT
Start: 2023-12-14 | End: 2023-12-14

## 2023-12-14 RX ADMIN — HEPARIN SODIUM 5000 UNITS: 5000 INJECTION INTRAVENOUS; SUBCUTANEOUS at 04:58

## 2023-12-14 RX ADMIN — ASPIRIN 81 MG: 81 TABLET, COATED ORAL at 09:27

## 2023-12-14 RX ADMIN — HEPARIN SODIUM 5000 UNITS: 5000 INJECTION INTRAVENOUS; SUBCUTANEOUS at 11:18

## 2023-12-14 RX ADMIN — GABAPENTIN 100 MG: 100 CAPSULE ORAL at 09:27

## 2023-12-14 RX ADMIN — MAGNESIUM OXIDE TAB 400 MG (241.3 MG ELEMENTAL MG) 200 MG: 400 (241.3 MG) TAB at 09:27

## 2023-12-14 RX ADMIN — ISOSORBIDE MONONITRATE 30 MG: 30 TABLET, EXTENDED RELEASE ORAL at 09:27

## 2023-12-14 ASSESSMENT — PAIN - FUNCTIONAL ASSESSMENT: PAIN_FUNCTIONAL_ASSESSMENT: 0-10

## 2023-12-14 ASSESSMENT — PAIN SCALES - GENERAL: PAINLEVEL_OUTOF10: 0 - NO PAIN

## 2023-12-14 NOTE — HOSPITAL COURSE
Rita Baez is a 89 y.o. female with past medical history significant for CLL, CKD3b, HTN, HLD, CAD s/p anteroseptal MI 1999 PCI to LAD (on Plavix) who presented to [facility] ED 12/11 as a LIMITED TRAUMA s/p ground level fall. Patient reports she lost her balance and fell forward striking her face on the ground. Denies LOC. Reports history of frequent falls and recently had another fall within the last week. States she uses a walker and has had poor balance.      List of clinically significant injuries/problems:  Subtle fracture of anterior aspect of medial wall left orbit/lamina papyracea  Soft tissue swelling and hemorrhage in the left facial soft tissues  Displaced fracture of the sternum, subacute  Several bilat chronic rib fractures  Subacute fractures of left superior and inferior pubic rami  Fracture of the sacrum at S3, subacute  Hyponatremia  Leukocytosis with hx CLL    Pt was seen by ortho and ENT for her injuries, no surgical intervention recommended. Hospitalist team following for asymptomatic hyponatremia that remained stable, recommended ongoing outpatient workup. She tolerated a diet and was having BMs. She remained without acute complaints during her hospitalization. Pt will be discharged to SNF and transition AL/LTC after.

## 2023-12-14 NOTE — PROGRESS NOTES
Rita Baez is a 89 y.o. female on day 3 of admission presenting with Fall, initial encounter.      Subjective   No complaints.  Pain improved.  Tolerating diet       Objective     Last Recorded Vitals  /56   Pulse 76   Temp 37.8 °C (100 °F) (Temporal)   Resp 19   Wt 54.4 kg (120 lb)   SpO2 97%   Intake/Output last 3 Shifts:    Intake/Output Summary (Last 24 hours) at 12/14/2023 1150  Last data filed at 12/14/2023 0647  Gross per 24 hour   Intake 560 ml   Output --   Net 560 ml       Admission Weight  Weight: 54.4 kg (120 lb) (12/11/23 2012)    Daily Weight  12/11/23 : 54.4 kg (120 lb)    Image Results  XR humerus left  Narrative: Interpreted By:  Constantino Lee,   STUDY:  XR HUMERUS LEFT      INDICATION:  Signs/Symptoms:fall, pain.      COMPARISON:  None      ACCESSION NUMBER(S):  [id number]      ORDERING CLINICIAN:  OLU ASHLEY      FINDINGS:  No evidence of fracture or dislocation. No osseous abnormality.      Impression: Unremarkable radiographs left humerus.      Signed by: Constantino Lee 12/12/2023 7:14 AM  Dictation workstation:   [id number]      Physical Exam  Vitals reviewed.   Constitutional:       Appearance: Normal appearance.   HENT:      Head: Normocephalic and atraumatic.      Mouth/Throat:      Mouth: Mucous membranes are moist.   Eyes:      Conjunctiva/sclera: Conjunctivae normal.   Cardiovascular:      Rate and Rhythm: Normal rate.      Pulses: Normal pulses.   Pulmonary:      Effort: Pulmonary effort is normal.      Breath sounds: Normal breath sounds. No wheezing.   Abdominal:      General: Abdomen is flat. There is no distension.      Palpations: Abdomen is soft.      Tenderness: There is no guarding.   Musculoskeletal:         General: No swelling. Normal range of motion.   Skin:     General: Skin is warm and dry.   Neurological:      General: No focal deficit present.      Mental Status: She is alert. Mental status is at baseline.   Psychiatric:         Mood and Affect: Mood  normal.         Behavior: Behavior normal.         Relevant Results               Assessment/Plan        Active Problems:    Hyponatremia    Admitted with falls  Med consult for hyponatremia.  Suspected secondary to vol overload.  Continue fluid restricted diet at discharge 1500mL at dc.  Repeat BMP in a week  -orders added to dc  Follow up with PCP after dc.  OK for dc              Kwasi Le MD

## 2023-12-14 NOTE — PROGRESS NOTES
Trauma Surgery Attending Note     My Acute Care Surgery LYNDON (Advanced Practice Provider) evaluated this patient today.  Please see that documentation for details.     I saw the patient with the LYNDON today, and personally evaluated and examined the patient.  My findings are consistent with those of the LYNDON.          Impression:      Hospital day #4  Posttrauma day #3     Known injuries:     # Fall  # Left orbital fx with soft tissue swelling and hemorrhage  # Displaced fx of sternum  # Pubic rami fx  # S3 fx     Patient without complaints; no pain  Patient is clinically stable  Orthopedic note from today reviewed -mobilize as tolerated     Plan:       Continue OT PT  SNF placement -possibly today

## 2023-12-14 NOTE — PROGRESS NOTES
"Rita Baez is a 89 y.o. female on day 3 of admission presenting with Fall, initial encounter.      Subjective    No overnight events.  Eating and drinking comfortably.  Pain much better controlled.  Not thrilled regarding using incentive spirometer, \"I am 89 years old, who cares how I am breathing.\"    Objective   Physical Exam  GENERAL: Elderly, awake/alert/oriented x3, no distress, alert and cooperative  HEENT: AT/NC, PERRL, EOMI, left periorbital ecchymosis (improving), nasal septum midline  NECK: Normal Inspection  CARDIOVASCULAR: RRR, no murmurs, 2+ equal pulses of the extremities, normal S1 and S 2, sternal ecchymosis  RESPIRATORY: CTAB, normal breath sounds with good chest expansion, No Wheezes, Rales or Rhonchi, not in acute respiratory distress  ABDOMEN: Soft, Non-Tender, Normal Bowel Sounds, No Distention  Pelvis: No hip pain to palpation  : Bilateral inguinal hernias  SKIN: Warm  EXTREMITIES: normal extremities, no lower extremity edema, scattered ecchymosis (improving), left upper extremity with pain to palpation (improving)  NEURO: A&O x 3, no focal deficits  PSYCH: Appropriate    Last Recorded Vitals  /56   Pulse 76   Temp 37.8 °C (100 °F) (Temporal)   Resp 19   Wt 54.4 kg (120 lb)   SpO2 97%        Assessment/Plan       #Hyponatremia, suspect secondary to increased water intake  -Sodium 125 on admission, 126 today. Initiated fluid restriction yesterday, urine sodium <20. Suspect her hyponatremia is secondary to increased free water intake, asymptomatic at this time. Would continue fluid restriction of 1500 mL. Stable for dc from a medicine standpoint. Repeat BMP in a week of dc, to be faxed to PCP.  -Counseled patient on fluid restriction at home. States eats regular diet at home.     #Mechanical fall  #Left orbital fracture with soft tissue swelling and hemorrhage  #Displaced fracture of sternum  #Pubic rami fracture  #S3 fracture  -Orthopedic surgery not planning intervention.  PT OT " evaluation, placement.  -Pain control  -ENT with no plans for intervention     #Bilateral inguinal hernia with herniation of bowel without obstruction  -Surgery following, has had previous discussions regarding repair and declines at this time     #CLL, multiple enlarged lymph nodes  -Followed by outpatient oncology at [facility]     #Anemia, not blood loss  -Monitor CBC     CKD stage IIIb  Hypertension  Hyperlipidemia  Coronary artery disease status post anterior septal MI 1999 PCI to LAD   -Continue home gabapentin, pravastatin, antihypertensives  -conversation had with pt and family members at bedside regarding risks vs benefit of continuing plavix and aspirin. Family members state pt has had multiple falls recently and would like to dc plavix and continue aspirin. Educated extensively. Agreeable with plan.     DVT prophylaxis: Heparin subcu  CODE STATUS: DNR/DNI        Pauly Meyers DO  Internal Medicine, PGY-II

## 2023-12-14 NOTE — DISCHARGE SUMMARY
Discharge Diagnosis  Fall, initial encounter    Issues Requiring Follow-Up  Hyponatremia    Test Results Pending At Discharge  Pending Labs       No current pending labs.            Hospital Course  Rita Baez is a 89 y.o. female with past medical history significant for CLL, CKD3b, HTN, HLD, CAD s/p anteroseptal MI 1999 PCI to LAD (on Plavix) who presented to [facility] ED 12/11 as a LIMITED TRAUMA s/p ground level fall. Patient reports she lost her balance and fell forward striking her face on the ground. Denies LOC. Reports history of frequent falls and recently had another fall within the last week. States she uses a walker and has had poor balance.      List of clinically significant injuries/problems:  Subtle fracture of anterior aspect of medial wall left orbit/lamina papyracea  Soft tissue swelling and hemorrhage in the left facial soft tissues  Displaced fracture of the sternum, subacute  Several bilat chronic rib fractures  Subacute fractures of left superior and inferior pubic rami  Fracture of the sacrum at S3, subacute  Hyponatremia  Leukocytosis with hx CLL    Pt was seen by ortho and ENT for her injuries, no surgical intervention recommended. Hospitalist team following for asymptomatic hyponatremia that remained stable, recommended ongoing outpatient workup. She tolerated a diet and was having BMs. She remained without acute complaints during her hospitalization. Pt will be discharged to SNF and transition AL/LTC after.     Pertinent Physical Exam At Time of Discharge  Physical Exam  Constitutional:       General: She is not in acute distress.     Appearance: Normal appearance. She is not ill-appearing.   HENT:      Head:      Comments: Midface stable, Lt kareen-orbital ecchymosis fading     Right Ear: External ear normal.      Left Ear: External ear normal.      Mouth/Throat:      Mouth: Mucous membranes are moist.   Eyes:      Extraocular Movements: Extraocular movements intact.       Conjunctiva/sclera: Conjunctivae normal.      Pupils: Pupils are equal, round, and reactive to light.   Cardiovascular:      Rate and Rhythm: Normal rate and regular rhythm.      Heart sounds: No murmur heard.  Pulmonary:      Effort: No respiratory distress.      Breath sounds: Normal breath sounds.   Chest:      Chest wall: No deformity or swelling.      Comments: Chest non-tender  Skin:     General: Skin is warm and dry.      Coloration: Skin is not pale.   Neurological:      General: No focal deficit present.      Mental Status: She is alert and oriented to person, place, and time.         Home Medications     Medication List      START taking these medications     sodium chloride 0.65 % nasal spray; Commonly known as: Ocean; Administer   2 sprays into each nostril 4 times a day as needed for congestion.     CONTINUE taking these medications     aspirin 81 mg EC tablet   atenolol 25 mg tablet; Commonly known as: Tenormin   biotin 10,000 mcg capsule   Calcium 600 + D(3) 600 mg-5 mcg (200 unit) tablet; Generic drug: calcium   carbonate-vitamin D3   cholecalciferol 125 MCG (5000 UT) capsule; Commonly known as: Vitamin   D-3   clopidogrel 75 mg tablet; Commonly known as: Plavix   cyanocobalamin (vitamin B-12) 5,000 mcg tablet, sublingual   gabapentin 100 mg capsule; Commonly known as: Neurontin   isosorbide mononitrate ER 30 mg 24 hr tablet; Commonly known as: Imdur   multivitamin with minerals tablet   pravastatin 20 mg tablet; Commonly known as: Pravachol       Outpatient Follow-Up  No future appointments.    Theresa Alfred PA-C

## 2023-12-14 NOTE — PROGRESS NOTES
COMFORTABLE    PAINLESS ROM OF BOTH HIPS  NV INTACT  STABLE  MOBILIZE as STANISLAV AND WILL SEE PRN

## 2023-12-14 NOTE — PROGRESS NOTES
12/14/23 1653   Discharge Planning   Living Arrangements Alone   Support Systems Family members;Children   Assistance Needed ADLs, rides to appointments   Type of Residence Private residence   Patient expects to be discharged to: [facility]   Does the patient need discharge transport arranged? Yes   RoundTrip coordination needed? Yes     Patient is accepted to [facility] and [facility].  Patient and her daughter verbalized their preference for [facility].  Transportation to be arranged.  Ten PAREDES TCC